# Patient Record
Sex: FEMALE | Race: WHITE | Employment: PART TIME | URBAN - METROPOLITAN AREA
[De-identification: names, ages, dates, MRNs, and addresses within clinical notes are randomized per-mention and may not be internally consistent; named-entity substitution may affect disease eponyms.]

---

## 2019-12-31 ENCOUNTER — OFFICE VISIT (OUTPATIENT)
Dept: FAMILY MEDICINE CLINIC | Age: 24
End: 2019-12-31
Payer: MEDICAID

## 2019-12-31 VITALS
DIASTOLIC BLOOD PRESSURE: 74 MMHG | TEMPERATURE: 98.3 F | BODY MASS INDEX: 36.39 KG/M2 | OXYGEN SATURATION: 98 % | WEIGHT: 205.4 LBS | RESPIRATION RATE: 16 BRPM | HEART RATE: 101 BPM | HEIGHT: 63 IN | SYSTOLIC BLOOD PRESSURE: 120 MMHG

## 2019-12-31 DIAGNOSIS — N94.6 DYSMENORRHEA: ICD-10-CM

## 2019-12-31 DIAGNOSIS — F90.2 ATTENTION DEFICIT HYPERACTIVITY DISORDER (ADHD), COMBINED TYPE: Primary | ICD-10-CM

## 2019-12-31 DIAGNOSIS — E78.2 MIXED HYPERLIPIDEMIA: ICD-10-CM

## 2019-12-31 DIAGNOSIS — L70.8 OTHER ACNE: ICD-10-CM

## 2019-12-31 DIAGNOSIS — Q82.5 BIRTH MARK: ICD-10-CM

## 2019-12-31 DIAGNOSIS — N94.6 PAINFUL MENSTRUAL PERIODS: ICD-10-CM

## 2019-12-31 DIAGNOSIS — F39 MOOD DISORDER (HCC): ICD-10-CM

## 2019-12-31 PROCEDURE — 99204 OFFICE O/P NEW MOD 45 MIN: CPT | Performed by: INTERNAL MEDICINE

## 2019-12-31 RX ORDER — ATORVASTATIN CALCIUM 20 MG/1
20 TABLET, FILM COATED ORAL DAILY
COMMUNITY
End: 2019-12-31 | Stop reason: ALTCHOICE

## 2019-12-31 RX ORDER — BUPROPION HYDROCHLORIDE 150 MG/1
150 TABLET ORAL EVERY MORNING
COMMUNITY
End: 2019-12-31 | Stop reason: SDUPTHER

## 2019-12-31 RX ORDER — CLINDAMYCIN AND BENZOYL PEROXIDE 10; 50 MG/G; MG/G
GEL TOPICAL
Qty: 50 G | Refills: 5 | Status: SHIPPED | OUTPATIENT
Start: 2019-12-31 | End: 2020-02-24

## 2019-12-31 RX ORDER — BUPROPION HYDROCHLORIDE 150 MG/1
150 TABLET ORAL EVERY MORNING
Qty: 30 TABLET | Refills: 5 | Status: SHIPPED | OUTPATIENT
Start: 2019-12-31 | End: 2020-05-28 | Stop reason: SDUPTHER

## 2019-12-31 RX ORDER — NAPROXEN 500 MG/1
500 TABLET ORAL 2 TIMES DAILY WITH MEALS
COMMUNITY
End: 2019-12-31 | Stop reason: SDUPTHER

## 2019-12-31 RX ORDER — NAPROXEN 500 MG/1
500 TABLET ORAL 2 TIMES DAILY PRN
Qty: 60 TABLET | Refills: 5 | Status: SHIPPED | OUTPATIENT
Start: 2019-12-31

## 2019-12-31 RX ORDER — METHYLPHENIDATE HYDROCHLORIDE 54 MG/1
54 TABLET, EXTENDED RELEASE ORAL EVERY MORNING
Qty: 30 TABLET | Refills: 0 | Status: SHIPPED | OUTPATIENT
Start: 2019-12-31 | End: 2020-08-13

## 2019-12-31 RX ORDER — ATORVASTATIN CALCIUM 20 MG/1
20 TABLET, FILM COATED ORAL DAILY
Qty: 30 TABLET | Refills: 5 | Status: CANCELLED | OUTPATIENT
Start: 2019-12-31

## 2019-12-31 RX ORDER — METHYLPHENIDATE HYDROCHLORIDE 54 MG/1
54 TABLET, EXTENDED RELEASE ORAL EVERY MORNING
COMMUNITY
End: 2019-12-31 | Stop reason: SDUPTHER

## 2019-12-31 RX ORDER — NORGESTIMATE AND ETHINYL ESTRADIOL 0.25-0.035
1 KIT ORAL DAILY
Qty: 1 PACKET | Refills: 11 | Status: SHIPPED | OUTPATIENT
Start: 2019-12-31 | End: 2020-08-13

## 2019-12-31 SDOH — HEALTH STABILITY: MENTAL HEALTH: HOW OFTEN DO YOU HAVE A DRINK CONTAINING ALCOHOL?: NEVER

## 2019-12-31 ASSESSMENT — PATIENT HEALTH QUESTIONNAIRE - PHQ9
SUM OF ALL RESPONSES TO PHQ9 QUESTIONS 1 & 2: 0
SUM OF ALL RESPONSES TO PHQ QUESTIONS 1-9: 0
SUM OF ALL RESPONSES TO PHQ QUESTIONS 1-9: 0
1. LITTLE INTEREST OR PLEASURE IN DOING THINGS: 0
2. FEELING DOWN, DEPRESSED OR HOPELESS: 0

## 2020-01-13 ENCOUNTER — HOSPITAL ENCOUNTER (OUTPATIENT)
Age: 25
Setting detail: SPECIMEN
Discharge: HOME | End: 2020-01-13
Payer: COMMERCIAL

## 2020-01-13 LAB
ABSOLUTE EOS #: 0.27 K/UL (ref 0–0.44)
ABSOLUTE IMMATURE GRANULOCYTE: <0.03 K/UL (ref 0–0.3)
ABSOLUTE LYMPH #: 1.64 K/UL (ref 1.1–3.7)
ABSOLUTE MONO #: 0.38 K/UL (ref 0.1–1.2)
ALBUMIN SERPL-MCNC: 4 G/DL (ref 3.5–5.2)
ALBUMIN/GLOBULIN RATIO: 1.2 (ref 1–2.5)
ALP BLD-CCNC: 65 U/L (ref 35–104)
ALT SERPL-CCNC: 31 U/L (ref 5–33)
ANION GAP SERPL CALCULATED.3IONS-SCNC: 13 MMOL/L (ref 9–17)
AST SERPL-CCNC: 23 U/L
BASOPHILS # BLD: 1 % (ref 0–2)
BASOPHILS ABSOLUTE: 0.04 K/UL (ref 0–0.2)
BILIRUB SERPL-MCNC: 0.25 MG/DL (ref 0.3–1.2)
BUN BLDV-MCNC: 12 MG/DL (ref 6–20)
BUN/CREAT BLD: ABNORMAL (ref 9–20)
CALCIUM SERPL-MCNC: 9.6 MG/DL (ref 8.6–10.4)
CHLORIDE BLD-SCNC: 104 MMOL/L (ref 98–107)
CO2: 23 MMOL/L (ref 20–31)
CREAT SERPL-MCNC: 0.61 MG/DL (ref 0.5–0.9)
DIFFERENTIAL TYPE: ABNORMAL
EOSINOPHILS RELATIVE PERCENT: 5 % (ref 1–4)
ESTIMATED AVERAGE GLUCOSE: 100 MG/DL
GFR AFRICAN AMERICAN: >60 ML/MIN
GFR NON-AFRICAN AMERICAN: >60 ML/MIN
GFR SERPL CREATININE-BSD FRML MDRD: ABNORMAL ML/MIN/{1.73_M2}
GFR SERPL CREATININE-BSD FRML MDRD: ABNORMAL ML/MIN/{1.73_M2}
GLUCOSE BLD-MCNC: 86 MG/DL (ref 70–99)
HBA1C MFR BLD: 5.1 % (ref 4–6)
HCT VFR BLD CALC: 41.2 % (ref 36.3–47.1)
HEMOGLOBIN: 13.9 G/DL (ref 11.9–15.1)
IMMATURE GRANULOCYTES: 0 %
LYMPHOCYTES # BLD: 32 % (ref 24–43)
MCH RBC QN AUTO: 30 PG (ref 25.2–33.5)
MCHC RBC AUTO-ENTMCNC: 33.7 G/DL (ref 28.4–34.8)
MCV RBC AUTO: 89 FL (ref 82.6–102.9)
MONOCYTES # BLD: 7 % (ref 3–12)
NRBC AUTOMATED: 0 PER 100 WBC
PDW BLD-RTO: 13.1 % (ref 11.8–14.4)
PLATELET # BLD: 337 K/UL (ref 138–453)
PLATELET ESTIMATE: ABNORMAL
PMV BLD AUTO: 10.2 FL (ref 8.1–13.5)
POTASSIUM SERPL-SCNC: 4.4 MMOL/L (ref 3.7–5.3)
RBC # BLD: 4.63 M/UL (ref 3.95–5.11)
RBC # BLD: ABNORMAL 10*6/UL
SEG NEUTROPHILS: 55 % (ref 36–65)
SEGMENTED NEUTROPHILS ABSOLUTE COUNT: 2.81 K/UL (ref 1.5–8.1)
SODIUM BLD-SCNC: 140 MMOL/L (ref 135–144)
TOTAL PROTEIN: 7.4 G/DL (ref 6.4–8.3)
TSH SERPL DL<=0.05 MIU/L-ACNC: 1.06 MIU/L (ref 0.3–5)
WBC # BLD: 5.2 K/UL (ref 3.5–11.3)
WBC # BLD: ABNORMAL 10*3/UL

## 2020-01-22 RX ORDER — ATOMOXETINE 40 MG/1
40 CAPSULE ORAL DAILY
Qty: 30 CAPSULE | Refills: 3 | Status: SHIPPED | OUTPATIENT
Start: 2020-01-22 | End: 2020-05-28 | Stop reason: SDUPTHER

## 2020-02-24 ENCOUNTER — OFFICE VISIT (OUTPATIENT)
Dept: OBGYN CLINIC | Age: 25
End: 2020-02-24
Payer: COMMERCIAL

## 2020-02-24 VITALS
DIASTOLIC BLOOD PRESSURE: 74 MMHG | WEIGHT: 204 LBS | SYSTOLIC BLOOD PRESSURE: 124 MMHG | BODY MASS INDEX: 36.14 KG/M2 | HEIGHT: 63 IN | HEART RATE: 84 BPM

## 2020-02-24 PROCEDURE — 99203 OFFICE O/P NEW LOW 30 MIN: CPT | Performed by: OBSTETRICS & GYNECOLOGY

## 2020-02-24 ASSESSMENT — ENCOUNTER SYMPTOMS
SHORTNESS OF BREATH: 0
ABDOMINAL PAIN: 0
COUGH: 0
BACK PAIN: 0

## 2020-02-24 NOTE — PROGRESS NOTES
organizations: Not on file     Relationship status: Not on file    Intimate partner violence:     Fear of current or ex partner: Not on file     Emotionally abused: Not on file     Physically abused: Not on file     Forced sexual activity: Not on file   Other Topics Concern    Not on file   Social History Narrative    Not on file     Family History   Problem Relation Age of Onset    High Blood Pressure Mother     Seizures Father     Lung Cancer Paternal Grandmother     Lung Cancer Paternal Grandfather        Physical exam Physical Exam  Constitutional:       Appearance: Normal appearance. HENT:      Head: Normocephalic and atraumatic. Eyes:      Extraocular Movements: Extraocular movements intact. Pupils: Pupils are equal, round, and reactive to light. Neurological:      General: No focal deficit present. Mental Status: She is alert and oriented to person, place, and time. Skin:     General: Skin is warm and dry. Psychiatric:         Mood and Affect: Mood normal.         Behavior: Behavior normal.         Thought Content: Thought content normal.         Judgment: Judgment normal.         Assessment/Plan  1. Menorrhagia with regular cycle  - Pt currently on OCPs for her heavy periods. She likes the sprintec and it is working well for her, so will continue it at this time. Pt uncertain if she needs refills, so she is to call the office if she needs them. 2. Encounter to establish care with new doctor  - Will obtain records from previous Ob/GYN. Release of records signed by pt.       Pt to follow up for her annual exam.     Danyel Corley MD  [address]

## 2020-03-16 ENCOUNTER — HOSPITAL ENCOUNTER (EMERGENCY)
Age: 25
Discharge: HOME | End: 2020-03-16
Attending: EMERGENCY MEDICINE
Payer: COMMERCIAL

## 2020-03-16 ENCOUNTER — APPOINTMENT (OUTPATIENT)
Dept: GENERAL RADIOLOGY | Age: 25
End: 2020-03-16
Payer: COMMERCIAL

## 2020-03-16 ENCOUNTER — NURSE TRIAGE (OUTPATIENT)
Dept: OTHER | Facility: CLINIC | Age: 25
End: 2020-03-16

## 2020-03-16 VITALS
OXYGEN SATURATION: 97 % | BODY MASS INDEX: 36.14 KG/M2 | HEART RATE: 98 BPM | HEIGHT: 63 IN | TEMPERATURE: 98.7 F | RESPIRATION RATE: 16 BRPM | DIASTOLIC BLOOD PRESSURE: 70 MMHG | WEIGHT: 204 LBS | SYSTOLIC BLOOD PRESSURE: 116 MMHG

## 2020-03-16 LAB
CHP ED QC CHECK: NORMAL
D-DIMER QUANTITATIVE: 0.43 MG/L FEU
DIRECT EXAM: NORMAL
Lab: NORMAL
MYOGLOBIN: <21 NG/ML (ref 25–58)
PREGNANCY TEST URINE, POC: NORMAL
SPECIMEN DESCRIPTION: NORMAL
TROPONIN INTERP: ABNORMAL
TROPONIN T: ABNORMAL NG/ML
TROPONIN, HIGH SENSITIVITY: <6 NG/L (ref 0–14)

## 2020-03-16 PROCEDURE — 87804 INFLUENZA ASSAY W/OPTIC: CPT

## 2020-03-16 PROCEDURE — 85379 FIBRIN DEGRADATION QUANT: CPT

## 2020-03-16 PROCEDURE — 83874 ASSAY OF MYOGLOBIN: CPT

## 2020-03-16 PROCEDURE — 84484 ASSAY OF TROPONIN QUANT: CPT

## 2020-03-16 PROCEDURE — 81025 URINE PREGNANCY TEST: CPT

## 2020-03-16 PROCEDURE — 71046 X-RAY EXAM CHEST 2 VIEWS: CPT

## 2020-03-16 PROCEDURE — 99283 EMERGENCY DEPT VISIT LOW MDM: CPT

## 2020-03-16 PROCEDURE — 93005 ELECTROCARDIOGRAM TRACING: CPT | Performed by: NURSE PRACTITIONER

## 2020-03-16 RX ORDER — IBUPROFEN 800 MG/1
800 TABLET ORAL EVERY 8 HOURS PRN
Qty: 20 TABLET | Refills: 0 | Status: SHIPPED | OUTPATIENT
Start: 2020-03-16 | End: 2021-03-29 | Stop reason: ALTCHOICE

## 2020-03-16 RX ORDER — CYCLOBENZAPRINE HCL 10 MG
10 TABLET ORAL 3 TIMES DAILY PRN
Qty: 15 TABLET | Refills: 0 | Status: ON HOLD | OUTPATIENT
Start: 2020-03-16 | End: 2020-11-09

## 2020-03-16 ASSESSMENT — ENCOUNTER SYMPTOMS
SHORTNESS OF BREATH: 0
NAUSEA: 0
VOMITING: 0
ABDOMINAL PAIN: 0
COUGH: 1
WHEEZING: 0

## 2020-03-16 ASSESSMENT — PAIN DESCRIPTION - LOCATION: LOCATION: CHEST

## 2020-03-16 ASSESSMENT — PAIN SCALES - GENERAL: PAINLEVEL_OUTOF10: 4

## 2020-03-16 NOTE — TELEPHONE ENCOUNTER
Call received from [address]    Reason for Disposition   SEVERE chest pain    Answer Assessment - Initial Assessment Questions  1. LOCATION: \"Where does it hurt? \"        Mid rest - all the way across - pain woke her from sleep on Saturday night    2. RADIATION: \"Does the pain go anywhere else? \" (e.g., into neck, jaw, arms, back)      Denies radiation into above sites    3. ONSET: \"When did the chest pain begin? \" (Minutes, hours or days)       3- - while at work - took [employer] and they did not help. Pepto-bismol and omeprazole are not effective in relieving pain    4. PATTERN \"Does the pain come and go, or has it been constant since it started? \"  \"Does it get worse with exertion? \"       Constant - does not go away unless laying down on the bed or couch - when standing for long periods of time it gets worse. 5. DURATION: \"How long does it last\" (e.g., seconds, minutes, hours)      Constant pain    6. SEVERITY: \"How bad is the pain? \"  (e.g., Scale 1-10; mild, moderate, or severe)     - MILD (1-3): doesn't interfere with normal activities      - MODERATE (4-7): interferes with normal activities or awakens from sleep     - SEVERE (8-10): excruciating pain, unable to do any normal activities        8/10    7. CARDIAC RISK FACTORS: \"Do you have any history of heart problems or risk factors for heart disease? \" (e.g., prior heart attack, angina; high blood pressure, diabetes, being overweight, high cholesterol, smoking, or strong family history of heart disease)      [place] [address] [facility] [place]    8. PULMONARY RISK FACTORS: \"Do you have any history of lung disease? \"  (e.g., blood clots in lung, asthma, emphysema, birth control pills)      Denies    9. CAUSE: \"What do you think is causing the chest pain? \"      Not sure - trying to eliminate spicy foods. 10. OTHER SYMPTOMS: \"Do you have any other symptoms? \" (e.g., dizziness, nausea, vomiting, sweating, fever, difficulty breathing, cough) Little bit of cough      11. PREGNANCY: \"Is there any chance you are pregnant? \" \"When was your last menstrual period? \"        Denies - on birth control and last period 3/10    Protocols used: CHEST PAIN-ADULT-[place]    Caller reports symptoms as documented above. Caller informed of disposition. She is not immediately agreeable as she does not want to miss work. After further discussion she states she is going to have her sister take her to the hospital for evaluation. Please do not respond to the triage nurse through this encounter. Any subsequent communication should be directly with the patient.

## 2020-03-16 NOTE — LETTER
[address] [facility]  [address] [id number]  Phone: [phone]             March 16, 2020    Patient: Jayda Ieyr   YOB: 1995   Date of Visit: 3/16/2020       To Whom It May Concern:    Jayda Iyer was seen and treated in our emergency department on 3/16/2020.      Sincerely,             Signature:__________________________________

## 2020-03-16 NOTE — ED PROVIDER NOTES
[address] [facility]  EMERGENCY DEPARTMENT ENCOUNTER      Pt Name: Lisa Yanez  MRN: [id number]  [place] 1995  Date of evaluation: 3/16/2020  Provider: Ta Altman       Chief Complaint   Patient presents with    Chest Pain     4 days    Cough     today         HISTORY OFPRESENT ILLNESS  (Location/Symptom, Timing/Onset, Context/Setting, Quality, Duration, Modifying Factors, Severity.)   Lisa Yanez is a 30 y.o. female who presents to the emergency department for evaluation of midsternal chest pain that started 4 days ago while she was at work. Her chest pain is 4 of 10. Is aggravated with certain movement as well as palpation to the chest.  She also planes of a cough that started today. No shortness of breath. 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 She does take birth control pills. No leg pain. No recent long distance travel. No fever. Nursing Notes were reviewed. PASTMEDICAL HISTORY     Past Medical History:   Diagnosis Date    ADHD     Depression     Hyperlipidemia     Candy-Parkinson-White (WPW) pattern          SURGICAL HISTORY       Past Surgical History:   Procedure Laterality Date    ATRIAL ABLATION SURGERY      due to WPW         CURRENT MEDICATIONS     Previous Medications    ATOMOXETINE (STRATTERA) 40 MG CAPSULE    Take 1 capsule by mouth daily    BUPROPION (WELLBUTRIN XL) 150 MG EXTENDED RELEASE TABLET    Take 1 tablet by mouth every morning    METHYLPHENIDATE (CONCERTA) 54 MG CR TABLET    Take 1 tablet by mouth every morning for 30 days.     NAPROXEN (NAPROSYN) 500 MG TABLET    Take 1 tablet by mouth 2 times daily as needed for Pain    NORGESTIMATE-ETHINYL ESTRADIOL (SPRINTEC 28) 0.25-35 MG-MCG PER TABLET    Take 1 tablet by mouth daily       ALLERGIES     Amoxicillin and Strawberry extract    FAMILY HISTORY       Family History   Problem Relation Age of Onset    High Blood Pressure Mother     Seizures Father     Lung Cancer Paternal Grandmother     Lung Cancer Paternal Grandfather           SOCIAL HISTORY       Social History     Socioeconomic History    Marital status: Single     Spouse name: None    Number of children: None    Years of education: None    Highest education level: None   Occupational History    None   Social Needs    Financial resource strain: None    Food insecurity     Worry: None     Inability: None    Transportation needs     Medical: None     Non-medical: None   Tobacco Use    Smoking status: Never Smoker    Smokeless tobacco: Never Used   Substance and Sexual Activity    Alcohol use: Never     Frequency: Never    Drug use: Never    Sexual activity: Never   Lifestyle    Physical activity     Days per week: None     Minutes per session: None    Stress: None   Relationships    Social connections     Talks on phone: None     Gets together: None     Attends Religion service: None     Active member of club or organization: None     Attends meetings of clubs or organizations: None     Relationship status: None    Intimate partner violence     Fear of current or ex partner: None     Emotionally abused: None     Physically abused: None     Forced sexual activity: None   Other Topics Concern    None   Social History Narrative    None         REVIEW OF SYSTEMS    (2-9 systems for level 4, 10 or more for level 5)     Review of Systems   Respiratory: Positive for cough. Negative for shortness of breath and wheezing. Cardiovascular: Positive for chest pain. Gastrointestinal: Negative for abdominal pain, nausea and vomiting. All other systems reviewed and are negative. Except as noted above the remainder of the review of systems was reviewed and negative.      PHYSICAL EXAM    (up to 7 for level 4, 8 or more for level 5)     ED Triage Vitals [03/16/20 1400]   BP Temp Temp Source Pulse Resp SpO2 Height Weight   116/70 98.7 °F (37.1 °C) Oral 98 16 97 % 5' 3\" (1.6 m) 204 lb (92.5 kg)       Physical Exam  Constitutional:       Appearance: Normal appearance. She is well-developed, well-groomed and normal weight. HENT:      Head: Normocephalic and atraumatic. Right Ear: External ear normal.      Left Ear: External ear normal.      Nose: Nose normal.      Mouth/Throat:      Mouth: Mucous membranes are moist.      Pharynx: Oropharynx is clear. Eyes:      Extraocular Movements: Extraocular movements intact. Conjunctiva/sclera: Conjunctivae normal.      Pupils: Pupils are equal, round, and reactive to light. Neck:      Musculoskeletal: Normal range of motion and neck supple. Cardiovascular:      Rate and Rhythm: Normal rate and regular rhythm. Pulses: Normal pulses. Heart sounds: Normal heart sounds, S1 normal and S2 normal.   Pulmonary:      Effort: Pulmonary effort is normal.      Breath sounds: Normal breath sounds and air entry. Chest:      Chest wall: Tenderness present. No crepitus. Musculoskeletal: Normal range of motion. Skin:     General: Skin is warm and dry. Capillary Refill: Capillary refill takes less than 2 seconds. Neurological:      Mental Status: She is alert and oriented to person, place, and time. GCS: GCS eye subscore is 4. GCS verbal subscore is 5. GCS motor subscore is 6. Cranial Nerves: Cranial nerves are intact. Sensory: Sensation is intact. Motor: Motor function is intact. Coordination: Coordination is intact. Gait: Gait is intact. Psychiatric:         Mood and Affect: Mood normal.         Behavior: Behavior normal.         Thought Content:  Thought content normal.         Judgment: Judgment normal.           DIAGNOSTIC RESULTS     EKG:All EKG's are interpreted by the Emergency Department Physician who either signs or Co-signs this chart in the absence of a cardiologist.    EKG interpreted by attending physician    RADIOLOGY:   Non-plain film images such as CT, Ultrasound and MRI are read by REFERRED TO:   DO Jody Haskins [address]  [address]    Schedule an appointment as soon as possible for a visit       [facility]  [address] [facility]  [phone]    If symptoms worsen      DISCHARGE MEDICATIONS:     New Prescriptions    CYCLOBENZAPRINE (FLEXERIL) 10 MG TABLET    Take 1 tablet by mouth 3 times daily as needed for Muscle spasms    IBUPROFEN (ADVIL;MOTRIN) 800 MG TABLET    Take 1 tablet by mouth every 8 hours as needed for Pain     Electronically signed by JOSE Rush 3/16/2020 at 3:27 PM           JOSE Rush CNP  03/16/20 1       Eva Corey MD  03/17/20 [id number]

## 2020-03-17 LAB — HCG, PREGNANCY URINE (POC): NEGATIVE

## 2020-03-17 NOTE — ED PROVIDER NOTES
eMERGENCY dEPARTMENT eNCOUnter   Independent Attestation     Pt Name: Syd Muniz  MRN: [id number]  [place] 1995  Date of evaluation: 3/17/20     Syd Muniz is a 30 y.o. female with CC: Chest Pain (4 days) and Cough (today)      Based on the medical record the care appears appropriate. I was personally available for consultation in the Emergency Department.     Arnav Luu MD  Attending Emergency Physician                  Stephany Sheppard MD  03/17/20 [id number]

## 2020-03-19 LAB
EKG ATRIAL RATE: 96 BPM
EKG P AXIS: 46 DEGREES
EKG P-R INTERVAL: 132 MS
EKG Q-T INTERVAL: 338 MS
EKG QRS DURATION: 80 MS
EKG QTC CALCULATION (BAZETT): 427 MS
EKG R AXIS: 44 DEGREES
EKG T AXIS: 14 DEGREES
EKG VENTRICULAR RATE: 96 BPM

## 2020-05-28 RX ORDER — BUPROPION HYDROCHLORIDE 150 MG/1
150 TABLET ORAL EVERY MORNING
Qty: 30 TABLET | Refills: 5 | Status: SHIPPED | OUTPATIENT
Start: 2020-05-28 | End: 2020-10-08 | Stop reason: ALTCHOICE

## 2020-05-28 RX ORDER — ATOMOXETINE 40 MG/1
40 CAPSULE ORAL DAILY
Qty: 30 CAPSULE | Refills: 3 | Status: SHIPPED | OUTPATIENT
Start: 2020-05-28 | End: 2020-09-29

## 2020-07-06 ENCOUNTER — OFFICE VISIT (OUTPATIENT)
Dept: DERMATOLOGY | Age: 25
End: 2020-07-06
Payer: COMMERCIAL

## 2020-07-06 VITALS
DIASTOLIC BLOOD PRESSURE: 76 MMHG | OXYGEN SATURATION: 97 % | SYSTOLIC BLOOD PRESSURE: 110 MMHG | TEMPERATURE: 97.2 F | HEIGHT: 63 IN | HEART RATE: 92 BPM | BODY MASS INDEX: 35.68 KG/M2 | WEIGHT: 201.4 LBS

## 2020-07-06 PROCEDURE — 99202 OFFICE O/P NEW SF 15 MIN: CPT | Performed by: DERMATOLOGY

## 2020-07-06 RX ORDER — KETOCONAZOLE 20 MG/ML
SHAMPOO TOPICAL
Qty: 120 ML | Refills: 11 | Status: SHIPPED | OUTPATIENT
Start: 2020-07-06 | End: 2021-07-27

## 2020-07-06 RX ORDER — CLINDAMYCIN PHOSPHATE 11.9 MG/ML
SOLUTION TOPICAL
Qty: 60 ML | Refills: 11 | Status: SHIPPED | OUTPATIENT
Start: 2020-07-06 | End: 2020-08-05

## 2020-07-06 NOTE — PATIENT INSTRUCTIONS
1. Wash hair 3-4 times weekly with ketoconazole shampoo  2. Referral to plastic surgery  3. Follow up in 3-4 months    Your Acne Treatment Prescribed by your Doctor: It is important to remember that oil glands respond very slowly and your skin will not change overnight. Your skin may get worse during the first weeks of treatment because all of the clogged pores are opening to the surface. Try not to get frustrated with your skin's slow response. Try to be consistent and follow these instructions from your doctor. If your acne has not responded to these treatments in 2-3 months, your doctor may recommend other medications. MORNING  Wash your face and other areas of acne with benzoyl peroxide. Apply clindamycin (Cleocin, Clindagel) to areas of acne in a thin layer. May include face, shoulders, back, and chest.    NIGHT  Wash your face and other areas of acne with benzoyl peroxide}. Apply tretinoin (Avita, Retin-A) to areas of acne in a thin layer. May include face, shoulders, back, and chest.    It is important to apply the right topical medication (cream or gel) at the right time of the day, so please follow the instructions written above. Products with benzoyl peroxide in them can bleach towels and clothes, so use them carefully. It is important to avoid moisturizers, make-up, hair products and sunscreens made with oil. These products can contribute to acne breakouts by plugging your pores. Look for \"oil-free\" and \"non-comedogenic\" products. Many brands such as Neutrogena, Aveeno, Cetaphil, Purpose, Eucerin and Olay make moisturizers and sunscreens that are oil-free. Washing your Face:  Wash your face 2 times a day with a mild soap or prescribed facial cleanser. Be gentle in washing your face and follow these steps:  Splash water onto your face  Lather the soap in your hands and gently rub onto your face. You do not need to use a washcloth.   Splash the face to rinse off the cleanser. Pat your face dry with a towel and, if it is time to, apply your prescription medication. Avoid Astringents. Please have your pharmacist call our office if you are having trouble getting your medications or need refills. Medications for Acne  *Topical and oral acne medications are not safe for pregnant women. No acne medications should be used by pregnant women without first consulting their physician *    Topical Medications (Creams and Gels) for Acne:  Topical medications are those applied to the outside of your skin. For these medications to work well, they need to be applied in a thin layer everywhere the acne comes and not just to the pimples you see. Follow these steps:  Put a chocolate chip size amount of medication cream/gel onto your finger. Dab the medicine onto your forehead, nose, chin, and each cheek. Then gently spread a thin layer across the entire face. Do not wash off this medicine. These medications can also be used on your chest, back and shoulder areas. Do not use over the counter acne creams or gels in addition to your prescribed medications unless directed by your doctor. Topical acne medications can cause irritation, redness, and dryness, especially when you first start them. If your prescribed topical cream or gel is too irritating at first, try using it every other day or once every 3 days instead of every day. As your skin becomes less sensitive, you may be able to start to use it every day. To help soothe the dryness, you can use an oil-free, \"non-comedogenic\" moisturizer. Many acne medications also make the skin more sensitive to sunlight, so it is important to use an oil-free, \"non-comedogenic\" sunscreen if you will be out in the sun for work or fun. Some products available include: Oil of Olay Complete Defense for Sensitive Skin, Purpose Facial Lotion, Cetaphil Lotion, Neutrogenia Moisturizer and Aveeno Moisturizer.   Some products contain both

## 2020-07-06 NOTE — PROGRESS NOTES
Dermatology Patient Note  [facility]  [address]  [address] [facility] [address]  Dept: [phone]  Dept Fax: [phone]      VISIT DATE: 7/6/2020   REFERRING PROVIDER: Bunny Wilkins DO      Naomi Torres is a 30 y.o. female  who presents today in the office for:    New Patient (Acne: No treatment. She has it ont the face, back, and chest. She develops bumps after shaving in her vaginal areal. )      HISTORY OF PRESENT ILLNESS:  HPI Acne:    Marla Owen was seen today for initial evaluation of acne. Duration of Acne:  several years     Course:  Worsening    Areas of Involvement: face, chets, back    Associated Symptoms: Pustules/Boils     Exacerbating Factors:  hormones    Current Skin Care Regimen: Washes face twice daily with soap and water    Previously Tried Medications: nothing      CURRENT MEDICATIONS:   Current Outpatient Medications   Medication Sig Dispense Refill    benzoyl peroxide 5 % external liquid Wash face, chest and back and groin 1-2 times daily 227 g 11    tretinoin (RETIN-A) 0.025 % cream Apply pea sized amount to face, chest and back nightly 45 g 11    clindamycin (CLEOCIN T) 1 % external solution Apply topically every morning to acne prone areas on face 60 mL 11    ketoconazole (NIZORAL) 2 % shampoo Apply 3-4 times weekly to scalp, leave on for five minutes prior to washing off 120 mL 11    buPROPion (WELLBUTRIN XL) 150 MG extended release tablet Take 1 tablet by mouth every morning 30 tablet 5    atomoxetine (STRATTERA) 40 MG capsule Take 1 capsule by mouth daily 30 capsule 3    ibuprofen (ADVIL;MOTRIN) 800 MG tablet Take 1 tablet by mouth every 8 hours as needed for Pain 20 tablet 0    cyclobenzaprine (FLEXERIL) 10 MG tablet Take 1 tablet by mouth 3 times daily as needed for Muscle spasms 15 tablet 0    norgestimate-ethinyl estradiol (SPRINTEC 28) 0.25-35 MG-MCG per tablet Take 1 tablet by mouth daily 1 packet 11    naproxen (NAPROSYN) 500 MG tablet Take 1 tablet by mouth 2 times daily as needed for Pain 60 tablet 5    Methylphenidate (CONCERTA) 54 MG CR tablet Take 1 tablet by mouth every morning for 30 days. 30 tablet 0     No current facility-administered medications for this visit. ALLERGIES:   Allergies   Allergen Reactions    Amoxicillin     Strawberry Extract Swelling       SOCIAL HISTORY:  Social History     Tobacco Use    Smoking status: Never Smoker    Smokeless tobacco: Never Used   Substance Use Topics    Alcohol use: Never     Frequency: Never       REVIEW OF SYSTEMS:  Review of Systems   Constitutional: Negative. Skin:Denies any new changing, growing or bleeding lesions or rashes except as described in the HPI     PHYSICAL EXAM:   /76 (Site: Left Upper Arm, Position: Sitting, Cuff Size: Large Adult)   Pulse 92   Temp 97.2 °F (36.2 °C)   Ht 5' 3\" (1.6 m)   Wt 201 lb 6.4 oz (91.4 kg)   SpO2 97%   BMI 35.68 kg/m²     General Exam:  General Appearance: No acute distress, Well nourished     Neuro: Alert and oriented to person, place and time  Psych: Normal affect   Lymph Node: Not performed    Cutaneous Exam: Performed as documented in clinic note below. 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 Acne vulgaris     2. Congenital benign skin mole  External Referral To Plastic Surgery   3. Seborrheic dermatitis         Plan of Action is as Follows:  Assessment 1. Acne vulgaris  1. Start benzoyl peroxide wash every morning  2. Start clindamycin soln every morning  3.   Start tretinoin 0.025% daily at bedtime  4. Follow-up in 3-4 months. 2. Congenital benign skin mole  - External Referral To Plastic Surgery    3. Seborrheic dermatitis  - Ketoconazole shampoo          Patient Instructions   1. Wash hair 3-4 times weekly with ketoconazole shampoo  2. Referral to plastic surgery  3. Follow up in 3-4 months    Your Acne Treatment Prescribed by your Doctor: It is important to remember that oil glands respond very slowly and your skin will not change overnight. Your skin may get worse during the first weeks of treatment because all of the clogged pores are opening to the surface. Try not to get frustrated with your skin's slow response. Try to be consistent and follow these instructions from your doctor. If your acne has not responded to these treatments in 2-3 months, your doctor may recommend other medications. MORNING  Wash your face and other areas of acne with benzoyl peroxide. Apply clindamycin (Cleocin, Clindagel) to areas of acne in a thin layer. May include face, shoulders, back, and chest.    NIGHT  Wash your face and other areas of acne with benzoyl peroxide}. Apply tretinoin (Avita, Retin-A) to areas of acne in a thin layer. May include face, shoulders, back, and chest.    It is important to apply the right topical medication (cream or gel) at the right time of the day, so please follow the instructions written above. Products with benzoyl peroxide in them can bleach towels and clothes, so use them carefully. It is important to avoid moisturizers, make-up, hair products and sunscreens made with oil. These products can contribute to acne breakouts by plugging your pores. Look for \"oil-free\" and \"non-comedogenic\" products. Many brands such as Neutrogena, Aveeno, Cetaphil, Purpose, Eucerin and Olay make moisturizers and sunscreens that are oil-free. Washing your Face:  Wash your face 2 times a day with a mild soap or prescribed facial cleanser.   Be gentle in washing your face and follow these steps:  Splash water onto your face  Lather the soap in your hands and gently rub onto your face. You do not need to use a washcloth. Splash the face to rinse off the cleanser. Pat your face dry with a towel and, if it is time to, apply your prescription medication. Avoid Astringents. Please have your pharmacist call our office if you are having trouble getting your medications or need refills. Medications for Acne  *Topical and oral acne medications are not safe for pregnant women. No acne medications should be used by pregnant women without first consulting their physician *    Topical Medications (Creams and Gels) for Acne:  Topical medications are those applied to the outside of your skin. For these medications to work well, they need to be applied in a thin layer everywhere the acne comes and not just to the pimples you see. Follow these steps:  Put a chocolate chip size amount of medication cream/gel onto your finger. Dab the medicine onto your forehead, nose, chin, and each cheek. Then gently spread a thin layer across the entire face. Do not wash off this medicine. These medications can also be used on your chest, back and shoulder areas. Do not use over the counter acne creams or gels in addition to your prescribed medications unless directed by your doctor. Topical acne medications can cause irritation, redness, and dryness, especially when you first start them. If your prescribed topical cream or gel is too irritating at first, try using it every other day or once every 3 days instead of every day. As your skin becomes less sensitive, you may be able to start to use it every day. To help soothe the dryness, you can use an oil-free, \"non-comedogenic\" moisturizer.   Many acne medications also make the skin more sensitive to sunlight, so it is important to use an oil-free, \"non-comedogenic\" sunscreen if you will be out in the sun for work or fun.  Some products available include: Oil of Olay Complete Defense for Sensitive Skin, Purpose Facial Lotion, Cetaphil Lotion, Neutrogenia Moisturizer and Aveeno Moisturizer. Some products contain both moisturizer and sunscreen. Topical acne medications and washes containing Benzoyl Peroxide may bleach your clothing, towels, and bedding. Take care when using these products so that your things don't get ruined. You may want to use white towels and sheets to minimize the bleaching effect. Oral Antibiotics (Pills) for Acne:  Antibiotics are an important part of treating acne. They work from the inside of your body to kill the bacteria that cause the red, inflamed bumps and pus-filled bumps. Oral antibiotics are often used in addition to topical creams and gels. Many antibiotics can cause nausea, stomach aches, vomiting or diarrhea. It is important to take your antibiotic with a large glass of water. Taking the antibiotic with food may be helpful, except for Tetracycline which must be taken on an empty stomach to be effective. Any antibiotic can cause an allergic reaction or rash. Your doctor will regularly ask you if you are having any side effects. Commonly used acne antibiotic pills include:    Erythromycin                                        Bactrim                                        Doxycycline  Clindamycin                                         Tetracycline                                 Minocycline    If you are on Minocycline, please report any problems with headaches, blurry vision, ringing ears, joint aches or new blue-gray spots on your skin. If you are on Tetracycline or Doxycycline, your skin will be very sensitive to sunlight and will burn easily so sun protection is important. If you are on Clindamycin, please report any problems with persistent diarrhea. Photo surveillance performed: No    Follow-up: Return in about 4 months (around 11/6/2020).       This note was

## 2020-07-27 ENCOUNTER — OFFICE VISIT (OUTPATIENT)
Dept: OBGYN CLINIC | Age: 25
End: 2020-07-27
Payer: COMMERCIAL

## 2020-07-27 VITALS
WEIGHT: 199 LBS | SYSTOLIC BLOOD PRESSURE: 122 MMHG | HEIGHT: 63 IN | DIASTOLIC BLOOD PRESSURE: 68 MMHG | TEMPERATURE: 98.2 F | HEART RATE: 88 BPM | BODY MASS INDEX: 35.26 KG/M2

## 2020-07-27 PROCEDURE — 99213 OFFICE O/P EST LOW 20 MIN: CPT | Performed by: OBSTETRICS & GYNECOLOGY

## 2020-07-27 ASSESSMENT — ENCOUNTER SYMPTOMS
BACK PAIN: 0
ABDOMINAL PAIN: 0
COUGH: 0
SHORTNESS OF BREATH: 0

## 2020-07-27 NOTE — PROGRESS NOTES
[facility]  [facility] - [address] [facility] [place] [facility]  Dept: [phone]  Dept Fax: [phone]    20    Chief Complaint   Patient presents with    Gynecologic Exam     prev pap 2018    Menstrual Problem       Dyan Rivers 30 y.o. is here to discuss her heavy irregular periods. She says that she is having 2 periods a month. She says even with her OCPs she is having this irregular bleeding. She has had a Mirena IUD in the past and still had heavy bleeding. She only kept it in for 5 weeks previously though. She wants to try something different. Review of Systems   Constitutional: Negative for chills and fever. HENT: Negative for congestion. Respiratory: Negative for cough and shortness of breath. Cardiovascular: Negative for chest pain and palpitations. Gastrointestinal: Negative for abdominal pain. Genitourinary: Negative for dyspareunia and vaginal discharge. Musculoskeletal: Negative for back pain. Neurological: Negative for dizziness and light-headedness. Psychiatric/Behavioral: The patient is not nervous/anxious. Gynecologic History  Patient's last menstrual period was 2020 (approximate).   Contraception: OCP (estrogen/progesterone)  Last Pap:   Results: normal  Last Mammogram: n/a    Obstetric History  : 2  Para: 1  AB: 1    Past Medical History:   Diagnosis Date    ADHD     Depression     Hyperlipidemia     Candy-Parkinson-White (WPW) pattern      Past Surgical History:   Procedure Laterality Date    ATRIAL ABLATION SURGERY      due to WPW     Allergies   Allergen Reactions    Amoxicillin     Strawberry Extract Swelling     Current Outpatient Medications   Medication Sig Dispense Refill    norelgestromin-ethinyl estradiol (ORTHO EVRA) 150-35 MCG/24HR Place 1 patch onto the skin once a week 3 patch 12    benzoyl peroxide 5 % external liquid Wash face, chest and back and groin 1-2 times daily 227 g 11    tretinoin (RETIN-A) 0.025 % cream Apply pea sized amount to face, chest and back nightly 45 g 11    clindamycin (CLEOCIN T) 1 % external solution Apply topically every morning to acne prone areas on face 60 mL 11    ketoconazole (NIZORAL) 2 % shampoo Apply 3-4 times weekly to scalp, leave on for five minutes prior to washing off 120 mL 11    buPROPion (WELLBUTRIN XL) 150 MG extended release tablet Take 1 tablet by mouth every morning 30 tablet 5    atomoxetine (STRATTERA) 40 MG capsule Take 1 capsule by mouth daily 30 capsule 3    ibuprofen (ADVIL;MOTRIN) 800 MG tablet Take 1 tablet by mouth every 8 hours as needed for Pain 20 tablet 0    cyclobenzaprine (FLEXERIL) 10 MG tablet Take 1 tablet by mouth 3 times daily as needed for Muscle spasms 15 tablet 0    norgestimate-ethinyl estradiol (SPRINTEC 28) 0.25-35 MG-MCG per tablet Take 1 tablet by mouth daily 1 packet 11    naproxen (NAPROSYN) 500 MG tablet Take 1 tablet by mouth 2 times daily as needed for Pain 60 tablet 5    Methylphenidate (CONCERTA) 54 MG CR tablet Take 1 tablet by mouth every morning for 30 days. 30 tablet 0     No current facility-administered medications for this visit.       Social History     Socioeconomic History    Marital status: Single     Spouse name: Not on file    Number of children: Not on file    Years of education: Not on file    Highest education level: Not on file   Occupational History    Not on file   Social Needs    Financial resource strain: Not on file    Food insecurity     Worry: Not on file     Inability: Not on file    Transportation needs     Medical: Not on file     Non-medical: Not on file   Tobacco Use    Smoking status: Never Smoker    Smokeless tobacco: Never Used   Substance and Sexual Activity    Alcohol use: Never     Frequency: Never    Drug use: Never    Sexual activity: Never   Lifestyle    Physical activity     Days per week: Not on file     Minutes per session: Not on file    Stress: Not on file   Relationships    Social connections     Talks on phone: Not on file     Gets together: Not on file     Attends Congregation service: Not on file     Active member of club or organization: Not on file     Attends meetings of clubs or organizations: Not on file     Relationship status: Not on file    Intimate partner violence     Fear of current or ex partner: Not on file     Emotionally abused: Not on file     Physically abused: Not on file     Forced sexual activity: Not on file   Other Topics Concern    Not on file   Social History Narrative    Not on file     Family History   Problem Relation Age of Onset    High Blood Pressure Mother     Seizures Father     Lung Cancer Paternal Grandmother     Lung Cancer Paternal Grandfather        Physical exam Physical Exam  Constitutional:       Appearance: Normal appearance. She is obese. HENT:      Head: Normocephalic. Eyes:      Extraocular Movements: Extraocular movements intact. Pulmonary:      Effort: Pulmonary effort is normal.   Neurological:      Mental Status: She is alert and oriented to person, place, and time. Psychiatric:         Mood and Affect: Mood normal.         Behavior: Behavior normal.         Thought Content: Thought content normal.         Judgment: Judgment normal.         Assessment/Plan  1. Breakthrough bleeding on birth control pills  - Has tried OCPs and continues to have irregular bleeding. Has tried Mirena in the past.  Discussed risks/benefits of various other options. Pt would like to try the patch. Discussed how and when to start it. Info given. Discussed possibility of trying Mirena again in the future if the patch doesn't work.       Pt to follow up for annual exam  Chantel Blue MD  [address]

## 2020-08-28 ENCOUNTER — TELEPHONE (OUTPATIENT)
Dept: OBGYN CLINIC | Age: 25
End: 2020-08-28

## 2020-08-28 RX ORDER — DROSPIRENONE AND ETHINYL ESTRADIOL 0.03MG-3MG
1 KIT ORAL DAILY
Qty: 1 PACKET | Refills: 12 | Status: SHIPPED | OUTPATIENT
Start: 2020-08-28 | End: 2020-11-30 | Stop reason: SDUPTHER

## 2020-08-28 NOTE — TELEPHONE ENCOUNTER
I will send in a different pill if that's what she would like. I'll send it to her pharmacy.   Thanks

## 2020-08-28 NOTE — TELEPHONE ENCOUNTER
Pt calling in thinking her [facility] patch is giving her migraines and she feels sick when she stands up. Mom suggested she go back on the pill but a lower dose. Neftali Olson

## 2020-09-29 RX ORDER — ATOMOXETINE 40 MG/1
CAPSULE ORAL
Qty: 30 CAPSULE | Refills: 0 | Status: SHIPPED | OUTPATIENT
Start: 2020-09-29 | End: 2020-10-08 | Stop reason: ALTCHOICE

## 2020-10-08 ENCOUNTER — OFFICE VISIT (OUTPATIENT)
Dept: FAMILY MEDICINE CLINIC | Age: 25
End: 2020-10-08
Payer: COMMERCIAL

## 2020-10-08 VITALS
DIASTOLIC BLOOD PRESSURE: 68 MMHG | SYSTOLIC BLOOD PRESSURE: 110 MMHG | WEIGHT: 189 LBS | RESPIRATION RATE: 18 BRPM | TEMPERATURE: 97.2 F | HEART RATE: 118 BPM | BODY MASS INDEX: 33.48 KG/M2 | OXYGEN SATURATION: 98 %

## 2020-10-08 PROCEDURE — 99213 OFFICE O/P EST LOW 20 MIN: CPT | Performed by: INTERNAL MEDICINE

## 2020-10-08 RX ORDER — DOXYCYCLINE HYCLATE 100 MG/1
100 CAPSULE ORAL EVERY 12 HOURS
COMMUNITY
End: 2020-10-15 | Stop reason: SDUPTHER

## 2020-10-08 RX ORDER — BUPROPION HYDROCHLORIDE 150 MG/1
150 TABLET ORAL EVERY MORNING
Qty: 30 TABLET | Refills: 5 | Status: SHIPPED | OUTPATIENT
Start: 2020-10-08 | End: 2020-11-12

## 2020-10-08 RX ORDER — PAROXETINE HYDROCHLORIDE 20 MG/1
20 TABLET, FILM COATED ORAL NIGHTLY
Qty: 30 TABLET | Refills: 11 | Status: SHIPPED | OUTPATIENT
Start: 2020-10-08 | End: 2020-11-12 | Stop reason: SDUPTHER

## 2020-10-08 RX ORDER — DEXTROAMPHETAMINE SACCHARATE, AMPHETAMINE ASPARTATE, DEXTROAMPHETAMINE SULFATE AND AMPHETAMINE SULFATE 2.5; 2.5; 2.5; 2.5 MG/1; MG/1; MG/1; MG/1
10 TABLET ORAL 2 TIMES DAILY
Qty: 60 TABLET | Refills: 0 | Status: SHIPPED | OUTPATIENT
Start: 2020-10-08 | End: 2021-03-19 | Stop reason: SDUPTHER

## 2020-10-08 RX ORDER — SULFAMETHOXAZOLE AND TRIMETHOPRIM 800; 160 MG/1; MG/1
1 TABLET ORAL EVERY 12 HOURS
COMMUNITY
End: 2020-10-15 | Stop reason: SDUPTHER

## 2020-10-08 ASSESSMENT — ENCOUNTER SYMPTOMS
DIARRHEA: 0
COUGH: 0
ABDOMINAL PAIN: 0
SHORTNESS OF BREATH: 0
CONSTIPATION: 0
CHOKING: 0
COLOR CHANGE: 1
CHEST TIGHTNESS: 0

## 2020-10-08 ASSESSMENT — PATIENT HEALTH QUESTIONNAIRE - PHQ9
2. FEELING DOWN, DEPRESSED OR HOPELESS: 0
SUM OF ALL RESPONSES TO PHQ QUESTIONS 1-9: 0
1. LITTLE INTEREST OR PLEASURE IN DOING THINGS: 0
SUM OF ALL RESPONSES TO PHQ9 QUESTIONS 1 & 2: 0
SUM OF ALL RESPONSES TO PHQ QUESTIONS 1-9: 0

## 2020-10-09 NOTE — PROGRESS NOTES
[address] [facility]  [address] [place] [address]  Dept: [phone]  Dept Fax: [phone]    Edvin Rosario a 30 y.o. female who presents today for her medical conditions/complaints as notedbelow. Basil Magdalena is c/o of   Chief Complaint   Patient presents with    Wound Check     happened 9/24/20- scratched it under a metal door    Follow-Up from Hospital     watch 10/4-10/7-[facility]         HPI:     Pt initially here for wound   scrapped right middle finger on a door and got caught  Was not very healed on 10/2 so went to urgent care   They started bactrim and doxy   They also gave her a shot of rocephin when she is allergic to pcn so she broke out and she had to take meds for that   She has been wrapping sometimes at night and doing triple abx   Started abx 5 days ago but the doxy makes her very very sick almost to the point of throwing up   She has not seen any drainage or pus out but feels it is underneath , is painful       Also ended up in [place] on psych unit for suicidal ideations   Ran away with abby she met online for a few days last week in a hotel   She states at that time she was suicidal , he made her tat way   Since being out she has not felt SI or plan   She is depressed overall and down   She feels her meds are not working   They did not adjust her meds when she was on SI watch inpt   All labs were normal there too in ED, no drugs or etoh   She has to f/u with psych they sent her her counselor through insurance a list of names in [address] [place] [address] who take insurance   She is going to call tomorrow   She got records from [facility] in regards to her dx, is somewhat upset she has to start over again   Is taking a mental health month from work [address] [place] [address] Drive work to get things straight  Does not sleep at night  Also has anxiety and panic attacks     Wound Check   She was originally treated 5 to 10 days ago.  Previous treatment included oral antibiotics and wound cleansing or irrigation. Her temperature was unmeasured prior to arrival. There has been no drainage from the wound. The redness has not changed. The swelling has not changed. The pain has improved. There is difficulty moving the extremity or digit due to pain. Hemoglobin A1C (%)   Date Value   01/13/2020 5.1         ( goal A1C is < 7)   No results found for: LABMICR  No results found for: LDLCHOLESTEROL, LDLCALC    (goal LDL is <100)   AST (U/L)   Date Value   01/13/2020 23     ALT (U/L)   Date Value   01/13/2020 31     BUN (mg/dL)   Date Value   01/13/2020 12     BP Readings from Last 3 Encounters:   10/08/20 110/68   08/13/20 (!) 123/96   07/27/20 122/68          (goal 120/80)    Past Medical History:   Diagnosis Date    ADHD     Depression     Hyperlipidemia     Candy-Parkinson-White (WPW) pattern       Past Surgical History:   Procedure Laterality Date    ATRIAL ABLATION SURGERY      due to WPW       Family History   Problem Relation Age of Onset    High Blood Pressure Mother     Seizures Father     Lung Cancer Paternal Grandmother     Lung Cancer Paternal Grandfather        Social History     Tobacco Use    Smoking status: Never Smoker    Smokeless tobacco: Never Used   Substance Use Topics    Alcohol use: Never     Frequency: Never      Current Outpatient Medications   Medication Sig Dispense Refill    doxycycline hyclate (VIBRAMYCIN) 100 MG capsule Take 100 mg by mouth every 12 hours      sulfamethoxazole-trimethoprim (BACTRIM DS;SEPTRA DS) 800-160 MG per tablet Take 1 tablet by mouth every 12 hours      amphetamine-dextroamphetamine (ADDERALL, 10MG,) 10 MG tablet Take 1 tablet by mouth 2 times daily for 30 days.  60 tablet 0    PARoxetine (PAXIL) 20 MG tablet Take 1 tablet by mouth nightly 30 tablet 11    buPROPion (WELLBUTRIN XL) 150 MG extended release tablet Take 1 tablet by mouth every morning 30 tablet 5    benzoyl peroxide 5 % external liquid Wash face, chest and back and groin 1-2 times daily 227 g 11    ketoconazole (NIZORAL) 2 % shampoo Apply 3-4 times weekly to scalp, leave on for five minutes prior to washing off 120 mL 11    ibuprofen (ADVIL;MOTRIN) 800 MG tablet Take 1 tablet by mouth every 8 hours as needed for Pain 20 tablet 0    cyclobenzaprine (FLEXERIL) 10 MG tablet Take 1 tablet by mouth 3 times daily as needed for Muscle spasms 15 tablet 0    drospirenone-ethinyl estradiol (MEDINA 28) 3-0.03 MG TABS Take 1 tablet by mouth daily (Patient not taking: Reported on 10/8/2020) 1 packet 12    tretinoin (RETIN-A) 0.025 % cream Apply pea sized amount to face, chest and back nightly (Patient not taking: Reported on 10/8/2020) 45 g 11    naproxen (NAPROSYN) 500 MG tablet Take 1 tablet by mouth 2 times daily as needed for Pain (Patient not taking: Reported on 8/13/2020) 60 tablet 5     No current facility-administered medications for this visit. Allergies   Allergen Reactions    Amoxicillin     Strawberry Extract Swelling          Health Maintenance   Topic Date Due    Varicella vaccine (1 of 2 - 2-dose childhood series) 11/11/1994    HPV vaccine (1 - 2-dose series) 11/11/2004    HIV screen  11/11/2008    DTaP/Tdap/Td vaccine (1 - Tdap) 11/11/2012    Cervical cancer screen  11/11/2014    Flu vaccine (1) 09/01/2020    Hepatitis A vaccine  Aged Out    Hepatitis B vaccine  Aged Out    Hib vaccine  Aged Out    Meningococcal (ACWY) vaccine  Aged Out    Pneumococcal 0-64 years Vaccine  Aged Out       Subjective:     Review of Systems   Constitutional: Positive for fatigue. Negative for activity change, appetite change, chills, diaphoresis, fever and unexpected weight change. Eyes: Negative for visual disturbance. Respiratory: Negative for cough, choking, chest tightness and shortness of breath. Cardiovascular: Negative for chest pain, palpitations and leg swelling.    Gastrointestinal: Negative for abdominal pain, constipation and diarrhea. Musculoskeletal: Positive for arthralgias. Skin: Positive for color change and wound. Negative for rash. Neurological: Negative for dizziness, light-headedness and headaches. Psychiatric/Behavioral: Positive for behavioral problems, decreased concentration, dysphoric mood and sleep disturbance. Negative for confusion, hallucinations, self-injury and suicidal ideas. The patient is nervous/anxious. The patient is not hyperactive. Objective:      Physical Exam  Vitals signs and nursing note reviewed. Constitutional:       General: She is not in acute distress. Appearance: She is obese. She is not ill-appearing, toxic-appearing or diaphoretic. HENT:      Head: Normocephalic and atraumatic. Neck:      Musculoskeletal: Normal range of motion and neck supple. Cardiovascular:      Rate and Rhythm: Normal rate and regular rhythm. Pulmonary:      Effort: Pulmonary effort is normal.      Breath sounds: Normal breath sounds. Skin:     General: Skin is warm and dry. Capillary Refill: Capillary refill takes less than 2 seconds. Findings: Wound present. Neurological:      General: No focal deficit present. Mental Status: She is alert and oriented to person, place, and time. Psychiatric:         Attention and Perception: Attention normal.         Mood and Affect: Mood is anxious and depressed. Affect is labile. Speech: Speech normal.         Behavior: Behavior is agitated. Thought Content: Thought content is not delusional. Thought content does not include suicidal ideation. Thought content does not include homicidal or suicidal plan. Cognition and Memory: Cognition and memory normal.       /68   Pulse 118   Temp 97.2 °F (36.2 °C) (Temporal)   Resp 18   Wt 189 lb (85.7 kg)   LMP 09/29/2020   SpO2 98%   BMI 33.48 kg/m²     Assessment:       Diagnosis Orders   1.  Attention deficit hyperactivity disorder (ADHD), combined type amphetamine-dextroamphetamine (ADDERALL, 10MG,) 10 MG tablet   2. Mood disorder ([facility] [address]     3. Autism     4. Wound check, abscess               Plan:       Return in about 2 weeks (around 10/22/2020), or if symptoms worsen or fail to improve, for depression med check. No orders of the defined types were placed in this encounter. Orders Placed This Encounter   Medications    amphetamine-dextroamphetamine (ADDERALL, 10MG,) 10 MG tablet     Sig: Take 1 tablet by mouth 2 times daily for 30 days. Dispense:  60 tablet     Refill:  0    PARoxetine (PAXIL) 20 MG tablet     Sig: Take 1 tablet by mouth nightly     Dispense:  30 tablet     Refill:  11    buPROPion (WELLBUTRIN XL) 150 MG extended release tablet     Sig: Take 1 tablet by mouth every morning     Dispense:  30 tablet     Refill:  5    get in touch with Florentino Perez appt with psychology and psychiatry  Will try to get adderall approved she was on before and stop strattera but may need to see specialist to get approved based on insurance, failed strattera     In the mean time for mood ; start paxil at bedtime   Will do wellbutrin for 2 weeks until paxil was in the system and then can stop   Controlled substances monitoring: possible medication side effects, risk of tolerance and/or dependence, and alternative treatments discussed, no signs of potential drug abuse or diversion identified and OARRS report reviewed today- activity consistent with treatment plan. For wound stop doxy   Finish bactrim ; ie 5 days   If no improvement or not more than 70/80 percent will try I and D. F/u in one week   Call with q/c      Patientgiven educational materials - see patient instructions. Discussed use, benefit,and side effects of prescribed medications. All patient questions answered. Ptvoiced understanding. Reviewed health maintenance. Instructed to continue currentmedications, diet and exercise. Patient agreed with treatment plan. Follow up asdirected. Electronically signed by Dominick Chong DO on 10/8/2020 at 9:11 PM

## 2020-10-15 ENCOUNTER — OFFICE VISIT (OUTPATIENT)
Dept: FAMILY MEDICINE CLINIC | Age: 25
End: 2020-10-15
Payer: COMMERCIAL

## 2020-10-15 VITALS
OXYGEN SATURATION: 98 % | HEIGHT: 63 IN | RESPIRATION RATE: 18 BRPM | BODY MASS INDEX: 33.49 KG/M2 | HEART RATE: 113 BPM | DIASTOLIC BLOOD PRESSURE: 72 MMHG | TEMPERATURE: 98.2 F | SYSTOLIC BLOOD PRESSURE: 116 MMHG | WEIGHT: 189 LBS

## 2020-10-15 PROCEDURE — 99213 OFFICE O/P EST LOW 20 MIN: CPT | Performed by: INTERNAL MEDICINE

## 2020-10-15 RX ORDER — SULFAMETHOXAZOLE AND TRIMETHOPRIM 800; 160 MG/1; MG/1
1 TABLET ORAL 2 TIMES DAILY
Qty: 10 TABLET | Refills: 0 | Status: SHIPPED | OUTPATIENT
Start: 2020-10-15 | End: 2020-10-20

## 2020-10-16 ASSESSMENT — ENCOUNTER SYMPTOMS
COLOR CHANGE: 1
ABDOMINAL PAIN: 0
DIARRHEA: 0
CONSTIPATION: 0

## 2020-10-16 NOTE — PROGRESS NOTES
Hyperlipidemia     Candy-Parkinson-White (WPW) pattern       Past Surgical History:   Procedure Laterality Date    ATRIAL ABLATION SURGERY      due to WPW       Family History   Problem Relation Age of Onset    High Blood Pressure Mother     Seizures Father     Lung Cancer Paternal Grandmother     Lung Cancer Paternal Grandfather        Social History     Tobacco Use    Smoking status: Never Smoker    Smokeless tobacco: Never Used   Substance Use Topics    Alcohol use: Never     Frequency: Never      Current Outpatient Medications   Medication Sig Dispense Refill    sulfamethoxazole-trimethoprim (BACTRIM DS;SEPTRA DS) 800-160 MG per tablet Take 1 tablet by mouth 2 times daily for 5 days 10 tablet 0    amphetamine-dextroamphetamine (ADDERALL, 10MG,) 10 MG tablet Take 1 tablet by mouth 2 times daily for 30 days.  60 tablet 0    buPROPion (WELLBUTRIN XL) 150 MG extended release tablet Take 1 tablet by mouth every morning 30 tablet 5    benzoyl peroxide 5 % external liquid Wash face, chest and back and groin 1-2 times daily 227 g 11    ketoconazole (NIZORAL) 2 % shampoo Apply 3-4 times weekly to scalp, leave on for five minutes prior to washing off 120 mL 11    ibuprofen (ADVIL;MOTRIN) 800 MG tablet Take 1 tablet by mouth every 8 hours as needed for Pain 20 tablet 0    cyclobenzaprine (FLEXERIL) 10 MG tablet Take 1 tablet by mouth 3 times daily as needed for Muscle spasms 15 tablet 0    PARoxetine (PAXIL) 20 MG tablet Take 1 tablet by mouth nightly (Patient not taking: Reported on 10/15/2020) 30 tablet 11    drospirenone-ethinyl estradiol (MEDINA 28) 3-0.03 MG TABS Take 1 tablet by mouth daily (Patient not taking: Reported on 10/8/2020) 1 packet 12    tretinoin (RETIN-A) 0.025 % cream Apply pea sized amount to face, chest and back nightly (Patient not taking: Reported on 10/8/2020) 45 g 11    naproxen (NAPROSYN) 500 MG tablet Take 1 tablet by mouth 2 times daily as needed for Pain (Patient not taking: Reported on 8/13/2020) 60 tablet 5     No current facility-administered medications for this visit. Allergies   Allergen Reactions    Amoxicillin     Strawberry Extract Swelling          Health Maintenance   Topic Date Due    Varicella vaccine (1 of 2 - 2-dose childhood series) 11/11/1994    HPV vaccine (1 - 2-dose series) 11/11/2004    HIV screen  11/11/2008    DTaP/Tdap/Td vaccine (1 - Tdap) 11/11/2012    Cervical cancer screen  11/11/2014    Flu vaccine (1) 09/01/2020    Hepatitis A vaccine  Aged Out    Hepatitis B vaccine  Aged Out    Hib vaccine  Aged Out    Meningococcal (ACWY) vaccine  Aged Out    Pneumococcal 0-64 years Vaccine  Aged Out       Subjective:     Review of Systems   Constitutional: Negative for activity change, appetite change, chills, diaphoresis, fatigue, fever and unexpected weight change. Eyes: Negative for visual disturbance. Cardiovascular: Negative for chest pain, palpitations and leg swelling. Gastrointestinal: Negative for abdominal pain, constipation and diarrhea. Musculoskeletal: Negative for arthralgias. Skin: Positive for color change and wound. Negative for rash. Neurological: Negative for dizziness and headaches. Psychiatric/Behavioral: Positive for behavioral problems and decreased concentration. Negative for self-injury, sleep disturbance and suicidal ideas. The patient is nervous/anxious and is hyperactive. Objective:      Physical Exam  Vitals signs and nursing note reviewed. Constitutional:       General: She is not in acute distress. Appearance: She is obese. She is not ill-appearing, toxic-appearing or diaphoretic. HENT:      Head: Normocephalic and atraumatic. Skin:     General: Skin is warm and dry. Findings: Abrasion, abscess and wound present. Neurological:      Mental Status: She is alert. Psychiatric:         Attention and Perception: Attention normal.         Mood and Affect: Mood is anxious. Speech: Speech normal.         Behavior: Behavior normal.         Cognition and Memory: Cognition and memory normal.       /72   Pulse 113   Temp 98.2 °F (36.8 °C) (Temporal)   Resp 18   Ht 5' 3\" (1.6 m)   Wt 189 lb (85.7 kg)   LMP 09/29/2020   SpO2 98%   BMI 33.48 kg/m²     Assessment:       Diagnosis Orders   1. Attention deficit hyperactivity disorder (ADHD), combined type     2. [facility]     3. Wound check, abscess               Plan:       Return in about 4 weeks (around 11/12/2020), or if symptoms worsen or fail to improve, for depression med check. No orders of the defined types were placed in this encounter. Orders Placed This Encounter   Medications    sulfamethoxazole-trimethoprim (BACTRIM DS;SEPTRA DS) 800-160 MG per tablet     Sig: Take 1 tablet by mouth 2 times daily for 5 days     Dispense:  10 tablet     Refill:  0    attempted to open on bloody drainage, minimal   Continue wound care   Will do 5 more days of bactrim to cover a full 2 weeks     Follow up in 4 weeks for med check   Start paxil daily at bedtime   Reviewed SE  Follow up with counselor   Call with q/c    Patientgiven educational materials - see patient instructions. Discussed use, benefit,and side effects of prescribed medications. All patient questions answered. Ptvoiced understanding. Reviewed health maintenance. Instructed to continue currentmedications, diet and exercise. Patient agreed with treatment plan. Follow up asdirected.      Electronically signed by Amaya Jimenez DO on 10/16/2020 at 2:34 PM

## 2020-10-29 PROBLEM — D48.5 NEOPLASM OF UNCERTAIN BEHAVIOR OF SKIN: Status: ACTIVE | Noted: 2020-10-29

## 2020-11-05 ENCOUNTER — HOSPITAL ENCOUNTER (OUTPATIENT)
Dept: PREADMISSION TESTING | Age: 25
Setting detail: SPECIMEN
Discharge: HOME | End: 2020-11-09
Payer: COMMERCIAL

## 2020-11-05 LAB
SARS-COV-2, RAPID: NORMAL
SARS-COV-2: NORMAL
SARS-COV-2: NOT DETECTED
SOURCE: NORMAL

## 2020-11-05 PROCEDURE — U0003 INFECTIOUS AGENT DETECTION BY NUCLEIC ACID (DNA OR RNA); SEVERE ACUTE RESPIRATORY SYNDROME CORONAVIRUS 2 (SARS-COV-2) (CORONAVIRUS DISEASE [COVID-19]), AMPLIFIED PROBE TECHNIQUE, MAKING USE OF HIGH THROUGHPUT TECHNOLOGIES AS DESCRIBED BY CMS-2020-01-R: HCPCS

## 2020-11-09 ENCOUNTER — HOSPITAL ENCOUNTER (OUTPATIENT)
Age: 25
Setting detail: OUTPATIENT SURGERY
Discharge: HOME | End: 2020-11-09
Attending: PLASTIC SURGERY | Admitting: PLASTIC SURGERY
Payer: COMMERCIAL

## 2020-11-09 VITALS
DIASTOLIC BLOOD PRESSURE: 87 MMHG | HEART RATE: 93 BPM | RESPIRATION RATE: 15 BRPM | WEIGHT: 189 LBS | HEIGHT: 63 IN | BODY MASS INDEX: 33.49 KG/M2 | TEMPERATURE: 97.6 F | OXYGEN SATURATION: 97 % | SYSTOLIC BLOOD PRESSURE: 133 MMHG

## 2020-11-09 LAB — HCG, PREGNANCY URINE (POC): NEGATIVE

## 2020-11-09 PROCEDURE — 81025 URINE PREGNANCY TEST: CPT

## 2020-11-09 PROCEDURE — 3600000012 HC SURGERY LEVEL 2 ADDTL 15MIN: Performed by: PLASTIC SURGERY

## 2020-11-09 PROCEDURE — 88305 TISSUE EXAM BY PATHOLOGIST: CPT

## 2020-11-09 PROCEDURE — 2709999900 HC NON-CHARGEABLE SUPPLY: Performed by: PLASTIC SURGERY

## 2020-11-09 PROCEDURE — 2500000003 HC RX 250 WO HCPCS: Performed by: PLASTIC SURGERY

## 2020-11-09 PROCEDURE — 7100000010 HC PHASE II RECOVERY - FIRST 15 MIN: Performed by: PLASTIC SURGERY

## 2020-11-09 PROCEDURE — 3600000002 HC SURGERY LEVEL 2 BASE: Performed by: PLASTIC SURGERY

## 2020-11-09 RX ORDER — LIDOCAINE HYDROCHLORIDE 10 MG/ML
INJECTION, SOLUTION INFILTRATION; PERINEURAL
Status: DISCONTINUED
Start: 2020-11-09 | End: 2020-11-09 | Stop reason: HOSPADM

## 2020-11-09 RX ORDER — LIDOCAINE HYDROCHLORIDE AND EPINEPHRINE 10; 10 MG/ML; UG/ML
INJECTION, SOLUTION INFILTRATION; PERINEURAL
Status: DISCONTINUED
Start: 2020-11-09 | End: 2020-11-09 | Stop reason: HOSPADM

## 2020-11-09 RX ORDER — BUPIVACAINE HYDROCHLORIDE 2.5 MG/ML
INJECTION, SOLUTION EPIDURAL; INFILTRATION; INTRACAUDAL
Status: DISCONTINUED
Start: 2020-11-09 | End: 2020-11-09 | Stop reason: WASHOUT

## 2020-11-09 RX ORDER — LIDOCAINE HYDROCHLORIDE AND EPINEPHRINE 10; 10 MG/ML; UG/ML
INJECTION, SOLUTION INFILTRATION; PERINEURAL PRN
Status: DISCONTINUED | OUTPATIENT
Start: 2020-11-09 | End: 2020-11-09 | Stop reason: ALTCHOICE

## 2020-11-09 ASSESSMENT — PAIN - FUNCTIONAL ASSESSMENT: PAIN_FUNCTIONAL_ASSESSMENT: 0-10

## 2020-11-09 ASSESSMENT — PAIN SCALES - GENERAL: PAINLEVEL_OUTOF10: 0

## 2020-11-09 NOTE — H&P
Patient ID: Esau Amaya is a 30 y.o. female. HPI   Patient comes in today to have 2 moles evaluated one on her L hand and L hip/buttocks. She is quite bothered by these. She denies any family Hx of skin CA. She says the one on the L hand has grown over the years, it once was flat but is raised not. She says it is sensitive when scratched. Review of Systems   Constitutional: Negative. HENT: Negative for congestion and trouble swallowing. Respiratory: Negative for cough and shortness of breath. Cardiovascular: Negative for chest pain. Gastrointestinal: Negative for abdominal pain. Neurological: Negative for light-headedness and headaches. Psychiatric/Behavioral: Negative for dysphoric mood. Medical History     Medical History     Diagnosis  Date  Comment  Source    Depression  10/2020  Suicidal Ideation  Provider    Congenital benign skin mole   left hand  Provider    ADHD    Provider       Other Medical History     Diagnosis  Date  Comment  Source    Candy-Parkinson-White (WPW) pattern   Had ablation 09/2012  Provider    Seborrheic dermatitis    Provider    Hypopigmented skin lesion   left hip  Provider    Hyperlipidemia    Provider    Acne vulgaris    Provider       Family History     Problem  Relation  Age of Onset  Comments    High Blood Pressure  Mother      Lung Cancer  Paternal Grandfather      Lung Cancer  Paternal Grandmother      Seizures  Father      Social History     Tobacco History     Smoking Status   Never Smoker    Smokeless Tobacco Use   Never Used    Alcohol History     Alcohol Use Status   Never    Drug Use     Drug Use Status   Never    Sexual Activity     Sexually Active   Never     Surgical History     Procedure  Laterality  Date  Comment  Source    ATRIAL ABLATION SURGERY   09/2012  due to WPW  Provider    E-Cigarettes Vaping or Juuling     Questions    Vaping Use    Responses: Never User    Start Date    Does device contain nicotine?     Responses: Never    Quit Date Vaping Type    Socioeconomic History     Employment History     No employment history on file. Family and Education     Marital Status    Single    Social Identity     Preferred Language  Ethnicity  Race    English  Non-/Non   White       Financial Resource Strain     No financial resource strain value on [employer] Insecurity     No food insecurity information on file    Transportation Needs     No transportation needs information on file         Medications Prior Authorizations    clindamycin (CLEOCIN) 150 MG capsule Take one PO QID. Start one day prior to surgery. amphetamine-dextroamphetamine (ADDERALL, 10MG,) 10 MG tablet () Take 1 tablet by mouth 2 times daily for 30 days. PARoxetine (PAXIL) 20 MG tablet Take 1 tablet by mouth nightly  Patient not taking: Reported on 10/15/2020    buPROPion (WELLBUTRIN XL) 150 MG extended release tablet Take 1 tablet by mouth every morning    drospirenone-ethinyl estradiol (MEDINA 28) 3-0.03 MG TABS Take 1 tablet by mouth daily  Patient not taking: Reported on 10/8/2020    benzoyl peroxide 5 % external liquid Wash face, chest and back and groin 1-2 times daily    tretinoin (RETIN-A) 0.025 % cream Apply pea sized amount to face, chest and back nightly  Patient not taking: Reported on 10/8/2020    ketoconazole (NIZORAL) 2 % shampoo Apply 3-4 times weekly to scalp, leave on for five minutes prior to washing off    ibuprofen (ADVIL;MOTRIN) 800 MG tablet Take 1 tablet by mouth every 8 hours as needed for Pain    cyclobenzaprine (FLEXERIL) 10 MG tablet Take 1 tablet by mouth 3 times daily as needed for Muscle spasms    naproxen (NAPROSYN) 500 MG tablet Take 1 tablet by mouth 2 times daily as needed for Pain  Patient not taking: Reported on 2020           Objective:   Physical Exam   Vitals signs and nursing note reviewed. Exam conducted with a chaperone present. Constitutional:   Appearance: Normal appearance.    HENT:   Head: Normocephalic and atraumatic. Mouth/Throat:   Mouth: Mucous membranes are moist.   Eyes:   Extraocular Movements: Extraocular movements intact. Conjunctiva/sclera: Conjunctivae normal.   Pupils: Pupils are equal, round, and reactive to light. Pulmonary:   Effort: Pulmonary effort is normal.   Musculoskeletal:   Hands:    Legs:    Skin:   General: Skin is warm and dry. Neurological:   General: No focal deficit present. Mental Status: She is alert and oriented to person, place, and time. Assessment:   Lesions. Plan:   Scheduled for excision of lesion left hand. I have discussed with the patient the indication, alternatives, and the possible risks and/or complications which include but are not limited to those delineated on the consent form for the planned procedure and the anesthesia methods. All questions were answered to patient's satisfaction. Consent was reviewed and signed.

## 2020-11-09 NOTE — BRIEF OP NOTE
Brief Postoperative Note      Patient: Shanique Cutler  YOB: 1995  MRN: [id number]    Date of Procedure: 11/9/2020    Pre-Op Diagnosis: LEFT HAND LESION    Post-Op Diagnosis: Same       Procedure(s):  HAND LESION BIOPSY EXCISION    Surgeon(s):  Castro Ruiz MD    Assistant:  * No surgical staff found *    Anesthesia: Local    Estimated Blood Loss (mL): Minimal    Complications: None    Specimens:   ID Type Source Tests Collected by Time Destination   A : LESION LEFT HAND Tissue Skin SURGICAL PATHOLOGY Castro Ruiz MD 11/9/2020 1105        Implants:  * No implants in log *      Drains: * No LDAs found *    Findings:     Electronically signed by Castro Ruiz MD on 11/9/2020 at 11:16 AM

## 2020-11-10 LAB — DERMATOLOGY PATHOLOGY REPORT: NORMAL

## 2020-11-12 ENCOUNTER — OFFICE VISIT (OUTPATIENT)
Dept: FAMILY MEDICINE CLINIC | Age: 25
End: 2020-11-12
Payer: COMMERCIAL

## 2020-11-12 VITALS
DIASTOLIC BLOOD PRESSURE: 78 MMHG | SYSTOLIC BLOOD PRESSURE: 118 MMHG | HEIGHT: 63 IN | OXYGEN SATURATION: 98 % | HEART RATE: 80 BPM | TEMPERATURE: 97.4 F | RESPIRATION RATE: 18 BRPM | BODY MASS INDEX: 34.91 KG/M2 | WEIGHT: 197 LBS

## 2020-11-12 PROCEDURE — 99213 OFFICE O/P EST LOW 20 MIN: CPT | Performed by: INTERNAL MEDICINE

## 2020-11-12 RX ORDER — ATOMOXETINE 40 MG/1
40 CAPSULE ORAL DAILY
Qty: 30 CAPSULE | Refills: 3 | Status: SHIPPED | OUTPATIENT
Start: 2020-11-12 | End: 2021-03-19 | Stop reason: SDUPTHER

## 2020-11-12 RX ORDER — PAROXETINE HYDROCHLORIDE 20 MG/1
20 TABLET, FILM COATED ORAL NIGHTLY
Qty: 30 TABLET | Refills: 11 | Status: SHIPPED | OUTPATIENT
Start: 2020-11-12 | End: 2021-03-29 | Stop reason: ALTCHOICE

## 2020-11-12 ASSESSMENT — ENCOUNTER SYMPTOMS
STRIDOR: 0
CHOKING: 0
ABDOMINAL PAIN: 0
CHEST TIGHTNESS: 0
DIARRHEA: 0
WHEEZING: 0
CONSTIPATION: 0
SHORTNESS OF BREATH: 0
COUGH: 0

## 2020-11-12 NOTE — PROGRESS NOTES
[address] [facility]  [address]  [facility] [address] [address]  Dept: [phone]  Dept Fax: [phone]    Antonia Bach a 30 y.o. female who presents today for her medical conditions/complaints as notedbelow. Anitha Contreras is c/o of   Chief Complaint   Patient presents with    Medication Check    Wound Check     left hand       HPI:     Feeling much better on medications  Sleeping very well on the paxil for the most part  Mood much better  Pharmacy is still refilled the wellbutrin though as on auto refill  No si/hi   Is back to work   Has psychiatrist but has only seen once    They still will not fill the adderall   Has been out of strattera for 2 weeks now     Had surgery on birth gege this monday   Will not have sutures out fo rover 3 weeks due to thanksgiving ie dec 1s   Steri strips came off the next day   They did not give post op wound care instructions so she has been covering with gauze   Open at home though only covered when out and at work         Wound Check   She was originally treated 3 to 5 days ago. Previous treatment included wound cleansing or irrigation and laceration repair. Her temperature was unmeasured prior to arrival. There has been no drainage from the wound. There is no redness present. There is no swelling present. The pain has improved.        Hemoglobin A1C (%)   Date Value   01/13/2020 5.1         ( goal A1C is < 7)   No results found for: LABMICR  No results found for: LDLCHOLESTEROL, LDLCALC    (goal LDL is <100)   AST (U/L)   Date Value   01/13/2020 23     ALT (U/L)   Date Value   01/13/2020 31     BUN (mg/dL)   Date Value   01/13/2020 12     BP Readings from Last 3 Encounters:   11/12/20 118/78   11/09/20 133/87   10/15/20 116/72          (goal 120/80)    Past Medical History:   Diagnosis Date    Acne vulgaris     ADHD     Congenital benign skin mole     left hand    Depression 10/2020    Suicidal Ideation    Hyperlipidemia     Hypopigmented skin lesion     left hip    Seborrheic dermatitis     Candy-Parkinson-White (WPW) pattern     Had ablation 09/2012      Past Surgical History:   Procedure Laterality Date    ATRIAL ABLATION SURGERY  09/2012    due to WPW    EXCISION/BIOPSY Left 11/09/2020    HAND LESION BIOPSY EXCISION - Left    HAND SURGERY Left 11/9/2020    HAND LESION BIOPSY EXCISION performed by Vick Iniguez MD at [address] [facility].       Family History   Problem Relation Age of Onset    High Blood Pressure Mother     Seizures Father     Lung Cancer Paternal Grandmother     Lung Cancer Paternal Grandfather        Social History     Tobacco Use    Smoking status: Never Smoker    Smokeless tobacco: Never Used   Substance Use Topics    Alcohol use: Never     Frequency: Never      Current Outpatient Medications   Medication Sig Dispense Refill    PARoxetine (PAXIL) 20 MG tablet Take 1 tablet by mouth nightly 30 tablet 11    buPROPion (WELLBUTRIN XL) 150 MG extended release tablet Take 1 tablet by mouth every morning 30 tablet 5    drospirenone-ethinyl estradiol (MEDINA 28) 3-0.03 MG TABS Take 1 tablet by mouth daily 1 packet 12    ketoconazole (NIZORAL) 2 % shampoo Apply 3-4 times weekly to scalp, leave on for five minutes prior to washing off 120 mL 11    naproxen (NAPROSYN) 500 MG tablet Take 1 tablet by mouth 2 times daily as needed for Pain 60 tablet 5    amphetamine-dextroamphetamine (ADDERALL, 10MG,) 10 MG tablet Take 1 tablet by mouth 2 times daily for 30 days.  60 tablet 0    benzoyl peroxide 5 % external liquid Wash face, chest and back and groin 1-2 times daily (Patient not taking: Reported on 11/12/2020) 227 g 11    tretinoin (RETIN-A) 0.025 % cream Apply pea sized amount to face, chest and back nightly (Patient not taking: Reported on 11/12/2020) 45 g 11    ibuprofen (ADVIL;MOTRIN) 800 MG tablet Take 1 tablet by mouth every 8 hours as needed for Pain 20 tablet 0     No current facility-administered medications for this visit. Allergies   Allergen Reactions    Amoxicillin     Strawberry Extract Swelling          Health Maintenance   Topic Date Due    Varicella vaccine (1 of 2 - 2-dose childhood series) 11/11/1994    HIV screen  11/11/2008    DTaP/Tdap/Td vaccine (1 - Tdap) 11/11/2012    Cervical cancer screen  11/11/2014    Flu vaccine (1) 09/01/2020    Hepatitis A vaccine  Aged Out    Hepatitis B vaccine  Aged Out    Hib vaccine  Aged Out    Meningococcal (ACWY) vaccine  Aged Out    Pneumococcal 0-64 years Vaccine  Aged Out       Subjective:     Review of Systems   Constitutional: Negative for activity change, appetite change, fatigue, fever and unexpected weight change. Eyes: Negative for visual disturbance. Respiratory: Negative for cough, choking, chest tightness, shortness of breath, wheezing and stridor. Cardiovascular: Negative for chest pain. Gastrointestinal: Negative for abdominal pain, constipation and diarrhea. Musculoskeletal: Negative for arthralgias. Skin: Positive for wound. Negative for rash. Neurological: Negative for headaches. Psychiatric/Behavioral: Positive for decreased concentration. Negative for dysphoric mood, hallucinations, self-injury, sleep disturbance and suicidal ideas. The patient is hyperactive. The patient is not nervous/anxious. Objective:      Physical Exam  Vitals signs and nursing note reviewed. Constitutional:       General: She is not in acute distress. Appearance: She is obese. She is not ill-appearing, toxic-appearing or diaphoretic. HENT:      Head: Normocephalic and atraumatic. Neck:      Musculoskeletal: Neck supple. Cardiovascular:      Rate and Rhythm: Normal rate and regular rhythm. Pulmonary:      Effort: Pulmonary effort is normal.      Breath sounds: Normal breath sounds. Neurological:      General: No focal deficit present.       Mental Status: She is alert and oriented to person, place, and time. Psychiatric:         Mood and Affect: Mood normal.         Speech: Speech is rapid and pressured. Behavior: Behavior is hyperactive. Thought Content: Thought content normal.         Cognition and Memory: Cognition normal.         Judgment: Judgment normal.       /78   Pulse 80   Temp 97.4 °F (36.3 °C) (Temporal)   Resp 18   Ht 5' 3\" (1.6 m)   Wt 197 lb (89.4 kg)   SpO2 98%   BMI 34.90 kg/m²     Assessment:       Diagnosis Orders   1. Attention deficit hyperactivity disorder (ADHD), combined type     2. Autism     3. Mood disorder ([facility] [address]     4. Panic disorder     5. Encounter for post surgical wound check               Plan:       Return in about 1 week (around 11/19/2020), or if symptoms worsen or fail to improve, for staple removal .    No orders of the defined types were placed in this encounter. No orders of the defined types were placed in this encounter. refilled medications   Restart strattera  Get paper work to us from therapist in regards to AD so we can send to insurance for [place] for adderall   Continue paxil 20 mg daily  Stop wellbutrin completely   Wound appears to be well healing     Will fu in 1.5 weeks ie nov 24th for suture removal here so to not delay . Call with q/c      Patientgiven educational materials - see patient instructions. Discussed use, benefit,and side effects of prescribed medications. All patient questions answered. Ptvoiced understanding. Reviewed health maintenance. Instructed to continue currentmedications, diet and exercise. Patient agreed with treatment plan. Follow up asdirected.      Electronically signed by Tung Barreto DO on 11/12/2020 at 4:32 PM

## 2020-11-13 NOTE — OP NOTE
[address] [facility] [address]                                OPERATIVE REPORT    PATIENT NAME: Ekaterina Quiñones                     :        1995  MED REC NO:   [id number]                             ROOM:  ACCOUNT NO:   [de-identified]                           ADMIT DATE: 2020  PROVIDER:     Sohan Lagos    DATE OF PROCEDURE:  2020    ATTENDING PHYSICIAN AND SURGEON:  Sohan Lagos MD    PREOPERATIVE DIAGNOSIS:  Lesion, left hand. POSTOPERATIVE DIAGNOSIS:  Lesion, left hand. PROCEDURE:  Excision of lesion of left hand (2.0 cm), taken with a 3-mm  margin, and with intermediate repair of 5.0 cm length. ANESTHESIA:  Local 1% Xylocaine with epinephrine, buffered. INDICATIONS:  The patient is a 57-year-old female with an irregular  lesion on the dorsal left hand, here for removal and diagnosis. The  procedure, the risks, the benefits were discussed with her. All  questions were answered to her satisfaction. Consent was signed. NARRATIVE SUMMARY:  The patient was brought to the operating suite,  placed in the supine position. Left arm was prepped and draped in the  usual sterile fashion. Following this, outline was made for excision  along with margin. Local anesthetic was infiltrated. Next, with a scalpel, incision was carried out around the perimeter of  the margin and through the full-thickness of the skin. Then, with sharp  and blunt scissor dissection, it was elevated up off the hand and passed  off as specimen. Bovie cautery was used for hemostasis. The wound was  then closed in layers with interrupted 4-0 Vicryl in the subcutaneous,  running intracuticular 4-0 Prolene in the skin. Steri-Strips for  dressing. The patient tolerated the procedure well. Blood loss,  minimal.  Specimens x1. Complications, none.   The patient was  transferred to recovery in stable

## 2020-11-24 ENCOUNTER — OFFICE VISIT (OUTPATIENT)
Dept: FAMILY MEDICINE CLINIC | Age: 25
End: 2020-11-24
Payer: COMMERCIAL

## 2020-11-24 VITALS
BODY MASS INDEX: 34.91 KG/M2 | OXYGEN SATURATION: 98 % | WEIGHT: 197 LBS | HEART RATE: 103 BPM | DIASTOLIC BLOOD PRESSURE: 78 MMHG | SYSTOLIC BLOOD PRESSURE: 118 MMHG | RESPIRATION RATE: 18 BRPM | HEIGHT: 63 IN

## 2020-11-24 PROCEDURE — 99213 OFFICE O/P EST LOW 20 MIN: CPT | Performed by: INTERNAL MEDICINE

## 2020-11-24 ASSESSMENT — ENCOUNTER SYMPTOMS
COLOR CHANGE: 0
CONSTIPATION: 0
ABDOMINAL PAIN: 0
DIARRHEA: 0

## 2020-11-24 NOTE — PROGRESS NOTES
[address] [facility]  [address]  [facility] [address] [address]  Dept: [phone]  Dept Fax: [phone]    Nathaniel Cabello a 30 y.o. female who presents today for her medical conditions/complaints as notedbelow. [facility] is c/o of   Chief Complaint   Patient presents with    Suture / Staple Removal         HPI:     Wound Check   She was originally treated 10 to 14 days ago. Previous treatment included wound cleansing or irrigation. Her temperature was unmeasured prior to arrival. There has been no drainage from the wound. The redness has improved. The swelling has improved. The pain has improved. There is difficulty moving the extremity or digit due to pain.        Hemoglobin A1C (%)   Date Value   01/13/2020 5.1         ( goal A1C is < 7)   No results found for: LABMICR  No results found for: LDLCHOLESTEROL, LDLCALC    (goal LDL is <100)   AST (U/L)   Date Value   01/13/2020 23     ALT (U/L)   Date Value   01/13/2020 31     BUN (mg/dL)   Date Value   01/13/2020 12     BP Readings from Last 3 Encounters:   11/24/20 118/78   11/12/20 118/78   11/09/20 133/87          (goal 120/80)    Past Medical History:   Diagnosis Date    Acne vulgaris     ADHD     Congenital benign skin mole     left hand    Depression 10/2020    Suicidal Ideation    Hyperlipidemia     Hypopigmented skin lesion     left hip    Seborrheic dermatitis     Candy-Parkinson-White (WPW) pattern     Had ablation 09/2012      Past Surgical History:   Procedure Laterality Date    ATRIAL ABLATION SURGERY  09/2012    due to WPW    EXCISION/BIOPSY Left 11/09/2020    HAND LESION BIOPSY EXCISION - Left    HAND SURGERY Left 11/9/2020    HAND LESION BIOPSY EXCISION performed by Yevgeniy Valadez MD at [address] [facility].       Family History   Problem Relation Age of Onset    High Blood Pressure Mother     Seizures Father     Lung Cancer Paternal Grandmother     Lung Cancer Paternal Grandfather        Social History     Tobacco Use    Smoking status: Never Smoker    Smokeless tobacco: Never Used   Substance Use Topics    Alcohol use: Never     Frequency: Never      Current Outpatient Medications   Medication Sig Dispense Refill    atomoxetine (STRATTERA) 40 MG capsule Take 1 capsule by mouth daily 30 capsule 3    PARoxetine (PAXIL) 20 MG tablet Take 1 tablet by mouth nightly 30 tablet 11    drospirenone-ethinyl estradiol (MEDINA 28) 3-0.03 MG TABS Take 1 tablet by mouth daily 1 packet 12    ketoconazole (NIZORAL) 2 % shampoo Apply 3-4 times weekly to scalp, leave on for five minutes prior to washing off 120 mL 11    ibuprofen (ADVIL;MOTRIN) 800 MG tablet Take 1 tablet by mouth every 8 hours as needed for Pain 20 tablet 0    naproxen (NAPROSYN) 500 MG tablet Take 1 tablet by mouth 2 times daily as needed for Pain 60 tablet 5    amphetamine-dextroamphetamine (ADDERALL, 10MG,) 10 MG tablet Take 1 tablet by mouth 2 times daily for 30 days. 60 tablet 0    benzoyl peroxide 5 % external liquid Wash face, chest and back and groin 1-2 times daily (Patient not taking: Reported on 11/12/2020) 227 g 11    tretinoin (RETIN-A) 0.025 % cream Apply pea sized amount to face, chest and back nightly (Patient not taking: Reported on 11/12/2020) 45 g 11     No current facility-administered medications for this visit.       Allergies   Allergen Reactions    Amoxicillin     Strawberry Extract Swelling          Health Maintenance   Topic Date Due    Varicella vaccine (1 of 2 - 2-dose childhood series) 11/11/1994    HIV screen  11/11/2008    DTaP/Tdap/Td vaccine (1 - Tdap) 11/11/2012    Cervical cancer screen  11/11/2014    Flu vaccine (1) 09/01/2020    Hepatitis A vaccine  Aged Out    Hepatitis B vaccine  Aged Out    Hib vaccine  Aged Out    Meningococcal (ACWY) vaccine  Aged Out    Pneumococcal 0-64 years Vaccine  Aged Out       Subjective:     Review of Systems   Constitutional: Negative for activity change, appetite change, chills, fatigue, fever and unexpected weight change. Eyes: Negative for visual disturbance. Cardiovascular: Negative for chest pain. Gastrointestinal: Negative for abdominal pain, constipation and diarrhea. Musculoskeletal: Negative for arthralgias. Skin: Positive for wound. Negative for color change and rash. Neurological: Negative for headaches. Psychiatric/Behavioral: Negative for sleep disturbance. Objective:      Physical Exam  Vitals signs and nursing note reviewed. Constitutional:       General: She is not in acute distress. Appearance: She is not ill-appearing, toxic-appearing or diaphoretic. HENT:      Head: Normocephalic and atraumatic. Skin:     General: Skin is warm and dry. Findings: Wound present. Neurological:      Mental Status: She is alert. /78   Pulse 103   Resp 18   Ht 5' 3\" (1.6 m)   Wt 197 lb (89.4 kg)   SpO2 98%   BMI 34.90 kg/m²     Assessment:       Diagnosis Orders   1. Visit for suture removal     2. Encounter for post surgical wound check               Plan:       No follow-ups on file. No orders of the defined types were placed in this encounter. No orders of the defined types were placed in this encounter. Patient presents for suture removal. The wound is well healed without signs of infection. The sutures are removed. Wound care and activity instructions given. Return prn. Patientgiven educational materials - see patient instructions. Discussed use, benefit,and side effects of prescribed medications. All patient questions answered. Ptvoiced understanding. Reviewed health maintenance. Instructed to continue currentmedications, diet and exercise. Patient agreed with treatment plan. Follow up asdirected.      Electronically signed by Otis Patino DO on 11/24/2020 at 4:26 PM

## 2020-11-30 ENCOUNTER — OFFICE VISIT (OUTPATIENT)
Dept: OBGYN CLINIC | Age: 25
End: 2020-11-30
Payer: COMMERCIAL

## 2020-11-30 VITALS
SYSTOLIC BLOOD PRESSURE: 111 MMHG | TEMPERATURE: 97.3 F | WEIGHT: 199.38 LBS | DIASTOLIC BLOOD PRESSURE: 75 MMHG | BODY MASS INDEX: 35.33 KG/M2 | HEART RATE: 88 BPM | HEIGHT: 63 IN

## 2020-11-30 PROBLEM — J45.909 ASTHMA: Status: ACTIVE | Noted: 2020-11-30

## 2020-11-30 PROBLEM — R45.851 SUICIDAL IDEATION: Status: ACTIVE | Noted: 2020-10-04

## 2020-11-30 PROBLEM — I45.6 WOLFF-PARKINSON-WHITE SYNDROME: Status: ACTIVE | Noted: 2020-11-30

## 2020-11-30 PROBLEM — E78.00 HYPERCHOLESTEREMIA: Status: ACTIVE | Noted: 2020-11-30

## 2020-11-30 PROCEDURE — 99395 PREV VISIT EST AGE 18-39: CPT | Performed by: OBSTETRICS & GYNECOLOGY

## 2020-11-30 RX ORDER — DROSPIRENONE AND ETHINYL ESTRADIOL 0.03MG-3MG
1 KIT ORAL DAILY
Qty: 1 PACKET | Refills: 12 | Status: SHIPPED | OUTPATIENT
Start: 2020-11-30 | End: 2021-08-24

## 2020-11-30 ASSESSMENT — ENCOUNTER SYMPTOMS
COUGH: 0
BACK PAIN: 0
SHORTNESS OF BREATH: 0
ABDOMINAL PAIN: 0
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
## 2020-12-10 ENCOUNTER — OFFICE VISIT (OUTPATIENT)
Dept: FAMILY MEDICINE CLINIC | Age: 25
End: 2020-12-10
Payer: COMMERCIAL

## 2020-12-10 ENCOUNTER — HOSPITAL ENCOUNTER (OUTPATIENT)
Age: 25
Setting detail: SPECIMEN
Discharge: HOME | End: 2020-12-10
Payer: COMMERCIAL

## 2020-12-10 VITALS
RESPIRATION RATE: 18 BRPM | TEMPERATURE: 99 F | OXYGEN SATURATION: 99 % | WEIGHT: 199 LBS | BODY MASS INDEX: 35.26 KG/M2 | HEIGHT: 63 IN | HEART RATE: 110 BPM

## 2020-12-10 LAB — S PYO AG THROAT QL: POSITIVE

## 2020-12-10 PROCEDURE — 87880 STREP A ASSAY W/OPTIC: CPT | Performed by: NURSE PRACTITIONER

## 2020-12-10 PROCEDURE — 99202 OFFICE O/P NEW SF 15 MIN: CPT | Performed by: NURSE PRACTITIONER

## 2020-12-10 RX ORDER — AZITHROMYCIN 250 MG/1
250 TABLET, FILM COATED ORAL SEE ADMIN INSTRUCTIONS
Qty: 6 TABLET | Refills: 0 | Status: SHIPPED | OUTPATIENT
Start: 2020-12-10 | End: 2020-12-15

## 2020-12-10 ASSESSMENT — ENCOUNTER SYMPTOMS
VOMITING: 0
SORE THROAT: 1
COUGH: 1
ABDOMINAL PAIN: 0
RHINORRHEA: 1
DIARRHEA: 0
SHORTNESS OF BREATH: 0
SINUS PAIN: 0
NAUSEA: 0

## 2020-12-10 NOTE — PATIENT INSTRUCTIONS

## 2020-12-10 NOTE — PROGRESS NOTES
improve. Orders Placed This Encounter   Medications    azithromycin (ZITHROMAX) 250 MG tablet     Sig: Take 1 tablet by mouth See Admin Instructions for 5 days 500mg on day 1 followed by 250mg on days 2 - 5     Dispense:  6 tablet     Refill:  0       Wells Score      PE  Calf swelling/tenderness +3 0  Tachycardia + 1.5 1.5  Immobilization +1.5 (car, plane, bedridden) 0  Recent surgery +1.5 0  History of DVT/ PE +1.5 0  Hemoptysis +1 0  Cancer +1 0    0-1= low probability  2-6= moderate probability  7+= high probability    Based on score of 1.5 patient would be considered low risk    Results for orders placed or performed in visit on 12/10/20   POCT rapid strep A   Result Value Ref Range    Strep A Ag Positive (A) None Detected     Patient instructed to complete entire antibiotic course. Tylenol/Motrin as needed for fever/discomfort. Change toothbrush in 24 hours. Salt water gargles and throat lozenges if desired. Patient agreeable to treatment plan. Educational materials provided on AVS.  Follow up if symptoms do not improve/worsen. Covid swab sent             Patient given educational materials - see patient instructions. Discussed use, benefit, and side effects of prescribed medications. All patientquestions answered. Pt voiced understanding. This note was transcribed using dictation with Dragon services. Efforts were made to correct any errors but some words may be misinterpreted.      Electronically signed by JOSE Ortega CNP on 12/10/2020at 7:05 PM

## 2020-12-12 LAB — SARS-COV-2, NAA: NOT DETECTED

## 2021-03-19 DIAGNOSIS — F90.2 ATTENTION DEFICIT HYPERACTIVITY DISORDER (ADHD), COMBINED TYPE: ICD-10-CM

## 2021-03-19 RX ORDER — DEXTROAMPHETAMINE SACCHARATE, AMPHETAMINE ASPARTATE, DEXTROAMPHETAMINE SULFATE AND AMPHETAMINE SULFATE 2.5; 2.5; 2.5; 2.5 MG/1; MG/1; MG/1; MG/1
10 TABLET ORAL 2 TIMES DAILY
Qty: 60 TABLET | Refills: 0 | Status: SHIPPED | OUTPATIENT
Start: 2021-03-19 | End: 2021-04-22 | Stop reason: SDUPTHER

## 2021-03-19 RX ORDER — ATOMOXETINE 40 MG/1
40 CAPSULE ORAL DAILY
Qty: 30 CAPSULE | Refills: 3 | Status: SHIPPED | OUTPATIENT
Start: 2021-03-19 | End: 2021-03-29 | Stop reason: ALTCHOICE

## 2021-03-29 ENCOUNTER — OFFICE VISIT (OUTPATIENT)
Dept: FAMILY MEDICINE CLINIC | Age: 26
End: 2021-03-29
Payer: COMMERCIAL

## 2021-03-29 VITALS
HEART RATE: 112 BPM | BODY MASS INDEX: 37.03 KG/M2 | OXYGEN SATURATION: 98 % | WEIGHT: 209 LBS | DIASTOLIC BLOOD PRESSURE: 100 MMHG | HEIGHT: 63 IN | RESPIRATION RATE: 18 BRPM | SYSTOLIC BLOOD PRESSURE: 150 MMHG

## 2021-03-29 DIAGNOSIS — R63.5 WEIGHT GAIN: ICD-10-CM

## 2021-03-29 DIAGNOSIS — F39 MOOD DISORDER (HCC): ICD-10-CM

## 2021-03-29 DIAGNOSIS — F84.0 AUTISM: ICD-10-CM

## 2021-03-29 DIAGNOSIS — R03.0 ELEVATED BLOOD PRESSURE READING: ICD-10-CM

## 2021-03-29 DIAGNOSIS — F41.0 PANIC DISORDER: Primary | ICD-10-CM

## 2021-03-29 PROCEDURE — 99214 OFFICE O/P EST MOD 30 MIN: CPT | Performed by: INTERNAL MEDICINE

## 2021-03-29 RX ORDER — BUSPIRONE HYDROCHLORIDE 10 MG/1
10 TABLET ORAL 3 TIMES DAILY PRN
Qty: 90 TABLET | Refills: 5 | Status: SHIPPED | OUTPATIENT
Start: 2021-03-29 | End: 2021-04-28

## 2021-03-29 RX ORDER — VENLAFAXINE HYDROCHLORIDE 75 MG/1
150 CAPSULE, EXTENDED RELEASE ORAL DAILY
Qty: 30 CAPSULE | Refills: 3 | Status: SHIPPED | OUTPATIENT
Start: 2021-03-29 | End: 2021-05-31 | Stop reason: SDUPTHER

## 2021-03-29 ASSESSMENT — PATIENT HEALTH QUESTIONNAIRE - PHQ9
SUM OF ALL RESPONSES TO PHQ QUESTIONS 1-9: 2
SUM OF ALL RESPONSES TO PHQ9 QUESTIONS 1 & 2: 2
SUM OF ALL RESPONSES TO PHQ QUESTIONS 1-9: 2

## 2021-03-29 ASSESSMENT — ENCOUNTER SYMPTOMS
SHORTNESS OF BREATH: 0
CHEST TIGHTNESS: 0
DIARRHEA: 0
CONSTIPATION: 0
COUGH: 0
CHOKING: 0
WHEEZING: 0
STRIDOR: 0
ABDOMINAL PAIN: 0

## 2021-03-29 NOTE — PROGRESS NOTES
Visit Information    Have you changed or started any medications since your last visit including any over-the-counter medicines, vitamins, or herbal medicines? no   Are you having any side effects from any of your medications? -  no  Have you stopped taking any of your medications? Is so, why? -  no    Have you seen any other physician or provider since your last visit? No  Have you had any other diagnostic tests since your last visit? No  Have you been seen in the emergency room and/or had an admission to a hospital since we last saw you? No  Have you had your routine dental cleaning in the past 6 months? no    Have you activated your [employer] account? If not, what are your barriers?  Yes     Patient Care Team:  Jodi Nichols DO as PCP - General (Family Medicine)  Jodi Nichols DO as PCP - [facility]    Medical History Review  Past Medical, Family, and Social History reviewed and does contribute to the patient presenting condition    Health Maintenance   Topic Date Due    Hepatitis C screen  Never done    Varicella vaccine (1 of 2 - 2-dose childhood series) Never done    Pneumococcal 0-64 years Vaccine (1 of 1 - PPSV23) Never done    HIV screen  Never done    COVID-19 Vaccine (1) Never done    DTaP/Tdap/Td vaccine (1 - Tdap) Never done    Cervical cancer screen  Never done    Flu vaccine (1) Never done    Hepatitis A vaccine  Aged Out    Hepatitis B vaccine  Aged Out    Hib vaccine  Aged Out    Meningococcal (ACWY) vaccine  Aged Out

## 2021-03-29 NOTE — PROGRESS NOTES
[facility]  [address] [place] [address]  Dept: [phone]  Dept Fax: [phone]    Catracho Harrington a 30 y.o. female who presents today for her medical conditions/complaints as notedbelow.   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, works part time   Has had the same job for a while now   Does not feel the paxil is working   Family feels she might be worse too   Crying more , irritable   No si/hi   She has been advised on psych before but has never set up appt but her mother wants her to see specialist   Also dealing with her daughters father who is in and out of the picture and still tries to have a relationship with her   She was on wellbutrin before this but cannot remember if it was better or worse or if it did anything   With panic attacks she is getting lightheaded , heart racing , chest pain         Hemoglobin A1C (%)   Date Value   01/13/2020 5.1         ( goal A1C is < 7)   No results found for: LABMICR  No results found for: LDLCHOLESTEROL, LDLCALC    (goal LDL is <100)   AST (U/L)   Date Value   01/13/2020 23     ALT (U/L)   Date Value   01/13/2020 31     BUN (mg/dL)   Date Value   01/13/2020 12     BP Readings from Last 3 Encounters:   03/29/21 (!) 150/100   12/01/20 111/71   11/30/20 111/75          (goal 120/80)    Past Medical History:   Diagnosis Date    Acne vulgaris     ADHD     Congenital benign skin mole     left hand    Depression 10/2020    Suicidal Ideation    Hyperlipidemia     Hypopigmented skin lesion     left hip    Seborrheic dermatitis     Candy-Parkinson-White (WPW) pattern     Had ablation 09/2012      Past Surgical History:   Procedure Laterality Date    ATRIAL ABLATION SURGERY  09/2012    due to WPW    EXCISION/BIOPSY Left 11/09/2020    HAND LESION BIOPSY EXCISION - Left    HAND SURGERY Left 11/9/2020    HAND LESION BIOPSY EXCISION performed by Tonio Barrera MD at [address] [facility].       Family History   Problem Relation Age of Onset    High Blood Pressure Mother     Seizures Father     Lung Cancer Paternal Grandmother     Lung Cancer Paternal Grandfather        Social History     Tobacco Use    Smoking status: Never Smoker    Smokeless tobacco: Never Used   Substance Use Topics    Alcohol use: Never     Frequency: Never      Current Outpatient Medications   Medication Sig Dispense Refill    venlafaxine (EFFEXOR XR) 75 MG extended release capsule Take 2 capsules by mouth daily 30 capsule 3    busPIRone (BUSPAR) 10 MG tablet Take 1 tablet by mouth 3 times daily as needed (panic attacks) 90 tablet 5    amphetamine-dextroamphetamine (ADDERALL, 10MG,) 10 MG tablet Take 1 tablet by mouth 2 times daily for 30 days. 60 tablet 0    drospirenone-ethinyl estradiol (MEDINA 28) 3-0.03 MG TABS Take 1 tablet by mouth daily 1 packet 12    benzoyl peroxide 5 % external liquid Wash face, chest and back and groin 1-2 times daily 227 g 11    tretinoin (RETIN-A) 0.025 % cream Apply pea sized amount to face, chest and back nightly 45 g 11    naproxen (NAPROSYN) 500 MG tablet Take 1 tablet by mouth 2 times daily as needed for Pain 60 tablet 5    ketoconazole (NIZORAL) 2 % shampoo Apply 3-4 times weekly to scalp, leave on for five minutes prior to washing off (Patient not taking: Reported on 3/29/2021) 120 mL 11     No current facility-administered medications for this visit.       Allergies   Allergen Reactions    Amoxicillin     Strawberry Extract Swelling          Health Maintenance   Topic Date Due    Pneumococcal 0-64 years Vaccine (1 of 1 - PPSV23) Never done    HIV screen  Never done    COVID-19 Vaccine (1) Never done    Cervical cancer screen  Never done    Flu vaccine (1) 06/30/2021 (Originally 9/1/2020)    DTaP/Tdap/Td vaccine (1 - Tdap) 03/29/2022 (Originally 11/11/2012)    Hepatitis A vaccine  Aged Out    Hepatitis B vaccine  Aged Out    Hib vaccine  Aged Out    Meningococcal (ACWY) vaccine  Aged Out    Varicella vaccine  Discontinued    Hepatitis C screen  Discontinued       Subjective:     Review of Systems   Constitutional: Negative for activity change, appetite change, chills, diaphoresis, fatigue, fever and unexpected weight change. Eyes: Negative for visual disturbance. Respiratory: Negative for cough, choking, chest tightness, shortness of breath, wheezing and stridor. Cardiovascular: Positive for palpitations. Negative for chest pain. Gastrointestinal: Negative for abdominal pain, constipation and diarrhea. Genitourinary: Positive for menstrual problem. Negative for pelvic pain. Musculoskeletal: Negative for arthralgias. Skin: Negative for rash. Neurological: Negative for headaches. Psychiatric/Behavioral: Positive for behavioral problems, decreased concentration, dysphoric mood and sleep disturbance. Negative for agitation, confusion, hallucinations, self-injury and suicidal ideas. The patient is nervous/anxious and is hyperactive. Objective:      Physical Exam  Vitals signs and nursing note reviewed. Constitutional:       General: She is not in acute distress. Appearance: Normal appearance. She is well-developed. She is obese. She is not ill-appearing, toxic-appearing or diaphoretic. HENT:      Head: Normocephalic and atraumatic. Right Ear: Tympanic membrane, ear canal and external ear normal.      Left Ear: Tympanic membrane, ear canal and external ear normal.   Neck:      Musculoskeletal: Normal range of motion and neck supple. No neck rigidity. Thyroid: No thyroid mass or thyroid tenderness. Vascular: No carotid bruit.    Cardiovascular:      Rate and Rhythm: Regular rhythm. Tachycardia present. No extrasystoles are present. Pulses: Normal pulses. Heart sounds: Normal heart sounds, S1 normal and S2 normal. Heart sounds not distant. No murmur. Pulmonary:      Effort: Pulmonary effort is normal. No bradypnea, accessory muscle usage, prolonged expiration, respiratory distress or retractions. Breath sounds: Normal breath sounds and air entry. No stridor, decreased air movement or transmitted upper airway sounds. No decreased breath sounds, wheezing, rhonchi or rales. Abdominal:      General: Bowel sounds are normal.      Palpations: Abdomen is soft. There is no hepatomegaly or splenomegaly. Tenderness: There is no abdominal tenderness. Musculoskeletal:      Right shoulder: She exhibits no tenderness, no bony tenderness, no pain, no spasm, normal pulse and normal strength. Left knee: She exhibits normal range of motion, no swelling, no effusion, no ecchymosis, no deformity, no laceration and no erythema. No tenderness found. Lumbar back: She exhibits spasm. She exhibits normal range of motion, no tenderness, no bony tenderness, no swelling, no edema, no deformity, no pain and normal pulse. Right lower leg: No edema. Left lower leg: No edema. Lymphadenopathy:      Cervical: No cervical adenopathy. Skin:     General: Skin is warm and dry. Findings: No rash. Neurological:      General: No focal deficit present. Mental Status: She is alert and oriented to person, place, and time. Cranial Nerves: Cranial nerves are intact. No cranial nerve deficit. Sensory: No sensory deficit. Motor: Motor function is intact. No atrophy or abnormal muscle tone. Coordination: Coordination is intact. Gait: Gait normal.   Psychiatric:         Attention and Perception: She is inattentive. Mood and Affect: Mood is anxious. Speech: Speech is rapid and pressured. Behavior: Behavior is hyperactive. Medications    venlafaxine (EFFEXOR XR) 75 MG extended release capsule     Sig: Take 2 capsules by mouth daily     Dispense:  30 capsule     Refill:  3     Cancel paxil    busPIRone (BUSPAR) 10 MG tablet     Sig: Take 1 tablet by mouth 3 times daily as needed (panic attacks)     Dispense:  90 tablet     Refill:  5    stop paxil start effexor  Did place referral to psych to get in ASAP   Trial buspar 2-3 times daily 10 mg as needed for anxiety /panic attacks  Reviewed when Sandy Boas to take   Continue with counseling /non med options to deal with stressors     Up date screening blood work and related labs for bp an weight gain   May have to monitor if continues high bp will have to stop the adderall      Patientgiven educational materials - see patient instructions. Discussed use, benefit,and side effects of prescribed medications. All patient questions answered. Ptvoiced understanding. Reviewed health maintenance. Instructed to continue currentmedications, diet and exercise. Patient agreed with treatment plan. Follow up asdirected.      Electronically signed by Jazlyn Dennis DO on 3/29/2021 at 12:58 PM

## 2021-04-02 ENCOUNTER — HOSPITAL ENCOUNTER (OUTPATIENT)
Age: 26
Setting detail: SPECIMEN
Discharge: HOME | End: 2021-04-02
Payer: COMMERCIAL

## 2021-04-02 DIAGNOSIS — R63.5 WEIGHT GAIN: ICD-10-CM

## 2021-04-02 DIAGNOSIS — R03.0 ELEVATED BLOOD PRESSURE READING: ICD-10-CM

## 2021-04-02 LAB
ABSOLUTE EOS #: 0.32 K/UL (ref 0–0.44)
ABSOLUTE IMMATURE GRANULOCYTE: 0.03 K/UL (ref 0–0.3)
ABSOLUTE LYMPH #: 2.1 K/UL (ref 1.1–3.7)
ABSOLUTE MONO #: 0.48 K/UL (ref 0.1–1.2)
ALBUMIN SERPL-MCNC: 4.1 G/DL (ref 3.5–5.2)
ALBUMIN/GLOBULIN RATIO: 1.2 (ref 1–2.5)
ALP BLD-CCNC: 55 U/L (ref 35–104)
ALT SERPL-CCNC: 16 U/L (ref 5–33)
ANION GAP SERPL CALCULATED.3IONS-SCNC: 17 MMOL/L (ref 9–17)
AST SERPL-CCNC: 17 U/L
BASOPHILS # BLD: 1 % (ref 0–2)
BASOPHILS ABSOLUTE: 0.06 K/UL (ref 0–0.2)
BILIRUB SERPL-MCNC: 0.22 MG/DL (ref 0.3–1.2)
BUN BLDV-MCNC: 9 MG/DL (ref 6–20)
BUN/CREAT BLD: ABNORMAL (ref 9–20)
CALCIUM SERPL-MCNC: 8.9 MG/DL (ref 8.6–10.4)
CHLORIDE BLD-SCNC: 103 MMOL/L (ref 98–107)
CO2: 18 MMOL/L (ref 20–31)
CORTISOL COLLECTION INFO: ABNORMAL
CORTISOL: 40.6 UG/DL (ref 2.7–18.4)
CREAT SERPL-MCNC: 0.64 MG/DL (ref 0.5–0.9)
DIFFERENTIAL TYPE: ABNORMAL
EOSINOPHILS RELATIVE PERCENT: 5 % (ref 1–4)
GFR AFRICAN AMERICAN: >60 ML/MIN
GFR NON-AFRICAN AMERICAN: >60 ML/MIN
GFR SERPL CREATININE-BSD FRML MDRD: ABNORMAL ML/MIN/{1.73_M2}
GFR SERPL CREATININE-BSD FRML MDRD: ABNORMAL ML/MIN/{1.73_M2}
GLUCOSE BLD-MCNC: 81 MG/DL (ref 70–99)
HCT VFR BLD CALC: 39.6 % (ref 36.3–47.1)
HEMOGLOBIN: 13.1 G/DL (ref 11.9–15.1)
IMMATURE GRANULOCYTES: 1 %
LYMPHOCYTES # BLD: 33 % (ref 24–43)
MCH RBC QN AUTO: 29.2 PG (ref 25.2–33.5)
MCHC RBC AUTO-ENTMCNC: 33.1 G/DL (ref 28.4–34.8)
MCV RBC AUTO: 88.2 FL (ref 82.6–102.9)
MONOCYTES # BLD: 8 % (ref 3–12)
NRBC AUTOMATED: 0 PER 100 WBC
PDW BLD-RTO: 13.1 % (ref 11.8–14.4)
PLATELET # BLD: 399 K/UL (ref 138–453)
PLATELET ESTIMATE: ABNORMAL
PMV BLD AUTO: 10.2 FL (ref 8.1–13.5)
POTASSIUM SERPL-SCNC: 4.1 MMOL/L (ref 3.7–5.3)
RBC # BLD: 4.49 M/UL (ref 3.95–5.11)
RBC # BLD: ABNORMAL 10*6/UL
SEDIMENTATION RATE, ERYTHROCYTE: 16 MM (ref 0–20)
SEG NEUTROPHILS: 52 % (ref 36–65)
SEGMENTED NEUTROPHILS ABSOLUTE COUNT: 3.29 K/UL (ref 1.5–8.1)
SODIUM BLD-SCNC: 138 MMOL/L (ref 135–144)
TOTAL PROTEIN: 7.5 G/DL (ref 6.4–8.3)
TSH SERPL DL<=0.05 MIU/L-ACNC: 1.86 MIU/L (ref 0.3–5)
WBC # BLD: 6.3 K/UL (ref 3.5–11.3)
WBC # BLD: ABNORMAL 10*3/UL

## 2021-04-06 LAB
ESTIMATED AVERAGE GLUCOSE: 97 MG/DL
HBA1C MFR BLD: 5 % (ref 4–6)

## 2021-04-10 LAB — DEXAMETHASONE: <50 NG/DL

## 2021-04-22 DIAGNOSIS — F90.2 ATTENTION DEFICIT HYPERACTIVITY DISORDER (ADHD), COMBINED TYPE: ICD-10-CM

## 2021-04-25 RX ORDER — DEXTROAMPHETAMINE SACCHARATE, AMPHETAMINE ASPARTATE, DEXTROAMPHETAMINE SULFATE AND AMPHETAMINE SULFATE 2.5; 2.5; 2.5; 2.5 MG/1; MG/1; MG/1; MG/1
10 TABLET ORAL 2 TIMES DAILY
Qty: 60 TABLET | Refills: 0 | Status: SHIPPED | OUTPATIENT
Start: 2021-04-25 | End: 2021-06-15

## 2021-06-15 ENCOUNTER — OFFICE VISIT (OUTPATIENT)
Dept: FAMILY MEDICINE CLINIC | Age: 26
End: 2021-06-15
Payer: COMMERCIAL

## 2021-06-15 VITALS
WEIGHT: 209 LBS | SYSTOLIC BLOOD PRESSURE: 130 MMHG | OXYGEN SATURATION: 98 % | HEIGHT: 63 IN | DIASTOLIC BLOOD PRESSURE: 80 MMHG | RESPIRATION RATE: 18 BRPM | BODY MASS INDEX: 37.03 KG/M2 | HEART RATE: 95 BPM

## 2021-06-15 DIAGNOSIS — R51.9 ACUTE INTRACTABLE HEADACHE, UNSPECIFIED HEADACHE TYPE: ICD-10-CM

## 2021-06-15 DIAGNOSIS — F90.9 ATTENTION DEFICIT HYPERACTIVITY DISORDER (ADHD), UNSPECIFIED ADHD TYPE: ICD-10-CM

## 2021-06-15 DIAGNOSIS — E24.9 HYPERCORTISOLISM (HCC): Primary | ICD-10-CM

## 2021-06-15 DIAGNOSIS — F41.9 ANXIETY: ICD-10-CM

## 2021-06-15 PROCEDURE — 99214 OFFICE O/P EST MOD 30 MIN: CPT | Performed by: NURSE PRACTITIONER

## 2021-06-15 RX ORDER — SUMATRIPTAN 50 MG/1
50 TABLET, FILM COATED ORAL
Qty: 9 TABLET | Refills: 1 | Status: SHIPPED | OUTPATIENT
Start: 2021-06-15 | End: 2021-07-27

## 2021-06-15 RX ORDER — ATOMOXETINE 40 MG/1
CAPSULE ORAL
COMMUNITY
Start: 2021-05-23 | End: 2021-07-26 | Stop reason: SDUPTHER

## 2021-06-15 ASSESSMENT — ENCOUNTER SYMPTOMS
SHORTNESS OF BREATH: 0
DIARRHEA: 0
VOMITING: 0
NAUSEA: 0
SORE THROAT: 0
ABDOMINAL PAIN: 0
COUGH: 0
SINUS PAIN: 0

## 2021-06-15 NOTE — PATIENT INSTRUCTIONS

## 2021-06-15 NOTE — PROGRESS NOTES
as needed for Migraine 9 tablet 1    venlafaxine (EFFEXOR XR) 75 MG extended release capsule Take 2 capsules by mouth daily 30 capsule 0    drospirenone-ethinyl estradiol (MEDINA 28) 3-0.03 MG TABS Take 1 tablet by mouth daily 1 packet 12    naproxen (NAPROSYN) 500 MG tablet Take 1 tablet by mouth 2 times daily as needed for Pain 60 tablet 5    benzoyl peroxide 5 % external liquid Wash face, chest and back and groin 1-2 times daily (Patient not taking: Reported on 6/15/2021) 227 g 11    tretinoin (RETIN-A) 0.025 % cream Apply pea sized amount to face, chest and back nightly (Patient not taking: Reported on 6/15/2021) 45 g 11    ketoconazole (NIZORAL) 2 % shampoo Apply 3-4 times weekly to scalp, leave on for five minutes prior to washing off (Patient not taking: Reported on 3/29/2021) 120 mL 11     No current facility-administered medications for this visit. Allergies   Allergen Reactions    Amoxicillin     Strawberry Extract Swelling       Subjective:      Review of Systems   Constitutional: Negative for chills and fever. HENT: Negative for ear pain, sinus pain and sore throat. Respiratory: Negative for cough and shortness of breath. Cardiovascular: Negative for chest pain and palpitations. Gastrointestinal: Negative for abdominal pain, diarrhea, nausea and vomiting. Neurological: Negative for dizziness and headaches. All other systems reviewed and are negative.      :Objective     Physical Exam  Vitals and nursing note reviewed. Constitutional:       General: She is not in acute distress. Appearance: Normal appearance. She is not toxic-appearing. HENT:      Mouth/Throat:      Mouth: Mucous membranes are moist.      Pharynx: Oropharynx is clear. Eyes:      Extraocular Movements: Extraocular movements intact. Pupils: Pupils are equal, round, and reactive to light. Cardiovascular:      Rate and Rhythm: Normal rate.    Pulmonary:      Effort: Pulmonary effort is normal. No respiratory distress. Breath sounds: Normal breath sounds. Skin:     General: Skin is warm and dry. Neurological:      General: No focal deficit present. Mental Status: She is alert and oriented to person, place, and time. /80   Pulse 95   Resp 18   Ht 5' 3\" (1.6 m)   Wt 209 lb (94.8 kg)   SpO2 98%   BMI 37.02 kg/m²     Lab Review   Hospital Outpatient Visit on 04/02/2021   Component Date Value    Dexamethasone 04/02/2021 <50.0     Cortisol 04/02/2021 40.6*    Cortisol Collection Info 04/02/2021 A. M.     Sed Rate 04/02/2021 16     Hemoglobin A1C 04/02/2021 5.0     Estimated Avg Glucose 04/02/2021 97     TSH 04/02/2021 1.86     Glucose 04/02/2021 81     BUN 04/02/2021 9     CREATININE 04/02/2021 0.64     Bun/Cre Ratio 04/02/2021 NOT REPORTED     Calcium 04/02/2021 8.9     Sodium 04/02/2021 138     Potassium 04/02/2021 4.1     Chloride 04/02/2021 103     CO2 04/02/2021 18*    Anion Gap 04/02/2021 17     Alkaline Phosphatase 04/02/2021 55     ALT 04/02/2021 16     AST 04/02/2021 17     Total Bilirubin 04/02/2021 0.22*    Total Protein 04/02/2021 7.5     Albumin 04/02/2021 4.1     Albumin/Globulin Ratio 04/02/2021 1.2     GFR Non- 04/02/2021 >60     GFR  04/02/2021 >60     GFR Comment 04/02/2021          GFR Staging 04/02/2021 NOT REPORTED     WBC 04/02/2021 6.3     RBC 04/02/2021 4.49     Hemoglobin 04/02/2021 13.1     Hematocrit 04/02/2021 39.6     MCV 04/02/2021 88.2     MCH 04/02/2021 29.2     MCHC 04/02/2021 33.1     RDW 04/02/2021 13.1     Platelets 80/96/0926 399     MPV 04/02/2021 10.2     NRBC Automated 04/02/2021 0.0     Differential Type 04/02/2021 NOT REPORTED     Seg Neutrophils 04/02/2021 52     Lymphocytes 04/02/2021 33     Monocytes 04/02/2021 8     Eosinophils % 04/02/2021 5*    Basophils 04/02/2021 1     Immature Granulocytes 04/02/2021 1*    Segs Absolute 04/02/2021 3.29     Absolute Lymph # 04/02/2021 2.10     Absolute Mono # 04/02/2021 0.48     Absolute Eos # 04/02/2021 0.32     Basophils Absolute 04/02/2021 0.06     Absolute Immature Granul* 04/02/2021 0.03     WBC Morphology 04/02/2021 NOT REPORTED     RBC Morphology 04/02/2021 NOT REPORTED     Platelet Estimate 50/93/0689 NOT REPORTED        Assessment and Plan      1. Hypercortisolism ([address]  Assessment & Plan:   Rechecking cortisol urine 24    Orders:  -     Cortisol, Urine, Free; Future  2. Acute intractable headache, unspecified headache type  Assessment & Plan:  Imitrex trial  D/c adderall    Orders:  -     SUMAtriptan (IMITREX) 50 MG tablet; Take 1 tablet by mouth once as needed for Migraine, Disp-9 tablet, R-1Normal  3. Anxiety  -     External Referral To Psychiatry  4. Attention deficit hyperactivity disorder (ADHD), unspecified ADHD type  Assessment & Plan:   Continue straterra  D/c adderall d/t side effects      Recheck headaches in 6 weeks                 No results found for this visit on 06/15/21. Return in about 6 weeks (around 7/27/2021), or if symptoms worsen or fail to improve. Orders Placed This Encounter   Medications    SUMAtriptan (IMITREX) 50 MG tablet     Sig: Take 1 tablet by mouth once as needed for Migraine     Dispense:  9 tablet     Refill:  1        Patient given educational materials - see patient instructions. Discussed use, benefit, and side effects of prescribed medications. All patientquestions answered. Pt voiced understanding. Patient given educational materials - see patient instructions. Discussed use, benefit, and side effects of prescribed medications. All patientquestions answered. Pt voiced understanding. This note was transcribed using dictation with Dragon services. Efforts were made to correct any errors but some words may be misinterpreted.     Electronically signed by JOSE Stauffer CNP on 6/15/2021at 12:33 PM

## 2021-06-21 RX ORDER — VENLAFAXINE HYDROCHLORIDE 75 MG/1
150 CAPSULE, EXTENDED RELEASE ORAL DAILY
Qty: 30 CAPSULE | Refills: 0 | Status: SHIPPED | OUTPATIENT
Start: 2021-06-21 | End: 2021-07-05 | Stop reason: SDUPTHER

## 2021-06-22 ENCOUNTER — HOSPITAL ENCOUNTER (OUTPATIENT)
Age: 26
Setting detail: SPECIMEN
Discharge: HOME | End: 2021-06-22
Payer: COMMERCIAL

## 2021-06-22 DIAGNOSIS — E24.9 HYPERCORTISOLISM (HCC): ICD-10-CM

## 2021-06-23 ENCOUNTER — TELEPHONE (OUTPATIENT)
Dept: FAMILY MEDICINE CLINIC | Age: 26
End: 2021-06-23

## 2021-06-23 ENCOUNTER — OFFICE VISIT (OUTPATIENT)
Dept: PRIMARY CARE CLINIC | Age: 26
End: 2021-06-23
Payer: COMMERCIAL

## 2021-06-23 VITALS
BODY MASS INDEX: 37.03 KG/M2 | WEIGHT: 209 LBS | HEIGHT: 63 IN | SYSTOLIC BLOOD PRESSURE: 105 MMHG | OXYGEN SATURATION: 100 % | DIASTOLIC BLOOD PRESSURE: 74 MMHG | HEART RATE: 84 BPM

## 2021-06-23 DIAGNOSIS — G43.101 MIGRAINE WITH AURA AND WITH STATUS MIGRAINOSUS, NOT INTRACTABLE: ICD-10-CM

## 2021-06-23 DIAGNOSIS — J01.10 ACUTE NON-RECURRENT FRONTAL SINUSITIS: Primary | ICD-10-CM

## 2021-06-23 PROCEDURE — 99213 OFFICE O/P EST LOW 20 MIN: CPT | Performed by: NURSE PRACTITIONER

## 2021-06-23 RX ORDER — ONDANSETRON 4 MG/1
4 TABLET, ORALLY DISINTEGRATING ORAL EVERY 6 HOURS PRN
Qty: 15 TABLET | Refills: 0 | Status: SHIPPED | OUTPATIENT
Start: 2021-06-23 | End: 2021-07-27 | Stop reason: SDUPTHER

## 2021-06-23 RX ORDER — AZITHROMYCIN 250 MG/1
250 TABLET, FILM COATED ORAL SEE ADMIN INSTRUCTIONS
Qty: 6 TABLET | Refills: 0 | Status: SHIPPED | OUTPATIENT
Start: 2021-06-23 | End: 2021-06-28

## 2021-06-23 RX ORDER — FLUTICASONE PROPIONATE 50 MCG
2 SPRAY, SUSPENSION (ML) NASAL DAILY
Qty: 1 BOTTLE | Refills: 0 | Status: SHIPPED | OUTPATIENT
Start: 2021-06-23

## 2021-06-23 RX ORDER — KETOROLAC TROMETHAMINE 30 MG/ML
60 INJECTION, SOLUTION INTRAMUSCULAR; INTRAVENOUS ONCE
Status: COMPLETED | OUTPATIENT
Start: 2021-06-23 | End: 2021-06-23

## 2021-06-23 RX ADMIN — KETOROLAC TROMETHAMINE 60 MG: 30 INJECTION, SOLUTION INTRAMUSCULAR; INTRAVENOUS at 13:29

## 2021-06-23 SDOH — ECONOMIC STABILITY: FOOD INSECURITY: WITHIN THE PAST 12 MONTHS, YOU WORRIED THAT YOUR FOOD WOULD RUN OUT BEFORE YOU GOT MONEY TO BUY MORE.: NEVER TRUE

## 2021-06-23 SDOH — ECONOMIC STABILITY: FOOD INSECURITY: WITHIN THE PAST 12 MONTHS, THE FOOD YOU BOUGHT JUST DIDN'T LAST AND YOU DIDN'T HAVE MONEY TO GET MORE.: NEVER TRUE

## 2021-06-23 ASSESSMENT — ENCOUNTER SYMPTOMS
VOMITING: 1
SORE THROAT: 0
SINUS PRESSURE: 1
PHOTOPHOBIA: 1
NAUSEA: 1

## 2021-06-23 ASSESSMENT — SOCIAL DETERMINANTS OF HEALTH (SDOH): HOW HARD IS IT FOR YOU TO PAY FOR THE VERY BASICS LIKE FOOD, HOUSING, MEDICAL CARE, AND HEATING?: NOT HARD AT ALL

## 2021-06-23 NOTE — LETTER
[facility]  [address]  [address]  Phone: [phone]  Fax: [phone]    Katina Record, APRN - CNP        June 23, 2021     Patient: Chilo Cid   YOB: 1995   Date of Visit: 6/23/2021       To Whom It May Concern: It is my medical opinion that Joe Walters may return to work on 6/26/2021. If you have any questions or concerns, please don't hesitate to call.     Sincerely,        Katina Barker, APRN - CNP

## 2021-06-23 NOTE — PATIENT INSTRUCTIONS
Take benadryl over the counter - take per package instructions when you get home  Push fluids, keep hydrated  Get plenty rest    Patient Education        Migraine Headache: Care Instructions  Overview     Migraines are painful, throbbing headaches that often start on one side of the head. They may cause nausea and vomiting and make you sensitive to light, sound, or smell. Without treatment, migraines can last from 4 hours to a few days. Medicines can help prevent migraines or stop them after they have started. Your doctor can help you find which ones work best for you. Follow-up care is a key part of your treatment and safety. Be sure to make and go to all appointments, and call your doctor if you are having problems. It's also a good idea to know your test results and keep a list of the medicines you take. How can you care for yourself at home? · Do not drive if you have taken a prescription pain medicine. · Rest in a quiet, dark room until your headache is gone. Close your eyes, and try to relax or go to sleep. Don't watch TV or read. · Put a cold, moist cloth or cold pack on the painful area for 10 to 20 minutes at a time. Put a thin cloth between the cold pack and your skin. · Use a warm, moist towel or a heating pad set on low to relax tight shoulder and neck muscles. · Have someone gently massage your neck and shoulders. · Take your medicines exactly as prescribed. Call your doctor if you think you are having a problem with your medicine. You will get more details on the specific medicines your doctor prescribes. · Don't take medicine for headache pain too often. Talk to your doctor if you are taking medicine more than 2 days a week to stop a headache. Taking too much pain medicine can lead to more headaches. These are called medicine-overuse headaches. To prevent migraines  · Keep a headache diary so you can figure out what triggers your headaches. Avoiding triggers may help you prevent headaches. Record when each headache began, how long it lasted, and what the pain was like. Write down any other symptoms you had with the headache, such as nausea, flashing lights or dark spots, or sensitivity to bright light or loud noise. Note if the headache occurred near your period. List anything that might have triggered the headache. Triggers may include certain foods (chocolate, cheese, wine) or odors, smoke, bright light, stress, or lack of sleep. · If your doctor has prescribed medicine for your migraines, take it as directed. You may have medicine that you take only when you get a migraine and medicine that you take all the time to help prevent migraines. ? If your doctor has prescribed medicine for when you get a headache, take it at the first sign of a migraine, unless your doctor has given you other instructions. ? If your doctor has prescribed medicine to prevent migraines, take it exactly as prescribed. Call your doctor if you think you are having a problem with your medicine. · Find healthy ways to deal with stress. Migraines are most common during or right after stressful times. Try finding ways to reduce stress like practicing mindfulness or deep breathing exercises. · Get plenty of sleep and exercise. But be careful to not push yourself too hard during exercise. It may trigger a headache. · Eat meals on a regular schedule. Avoid foods and drinks that often trigger migraines. These include chocolate, alcohol (especially red wine and port), aspartame, monosodium glutamate (MSG), and some additives found in foods (such as hot dogs, la, cold cuts, aged cheeses, and pickled foods). · Limit caffeine. Don't drink too much coffee, tea, or soda. But don't quit caffeine suddenly. That can also give you migraines. · Do not smoke or allow others to smoke around you. If you need help quitting, talk to your doctor about stop-smoking programs and medicines. These can increase your chances of quitting for good. · If you are taking birth control pills or hormone therapy, talk to your doctor about whether they are triggering your migraines. When should you call for help? Call 911 anytime you think you may need emergency care. For example, call if:    · You have signs of a stroke. These may include:  ? Sudden numbness, paralysis, or weakness in your face, arm, or leg, especially on only one side of your body. ? Sudden vision changes. ? Sudden trouble speaking. ? Sudden confusion or trouble understanding simple statements. ? Sudden problems with walking or balance. ? A sudden, severe headache that is different from past headaches. Call your doctor now or seek immediate medical care if:    · You have new or worse nausea and vomiting.     · You have a new or higher fever.     · Your headache gets much worse. Watch closely for changes in your health, and be sure to contact your doctor if:    · You are not getting better after 2 days (48 hours). Where can you learn more? Go to https://Restaurant.com.ReefEdge. org and sign in to your [employer] account. Enter G845 in the [employer] box to learn more about \"Migraine Headache: Care Instructions. \"     If you do not have an account, please click on the \"Sign Up Now\" link. Current as of: August 4, 2020               Content Version: 12.9  © 2006-2021 [employer]. Care instructions adapted under license by [facility] ([facility]). If you have questions about a medical condition or this instruction, always ask your healthcare professional. [place] [address] any warranty or liability for your use of this information. Patient Education        Sinusitis: Care Instructions  Your Care Instructions     Sinusitis is an infection of the lining of the sinus cavities in your head. Sinusitis often follows a cold. It causes pain and pressure in your head and face. In most cases, sinusitis gets better on its own in 1 to 2 weeks.  But some mild symptoms may last for several weeks. Sometimes antibiotics are needed. Follow-up care is a key part of your treatment and safety. Be sure to make and go to all appointments, and call your doctor if you are having problems. It's also a good idea to know your test results and keep a list of the medicines you take. How can you care for yourself at home? · Take an over-the-counter pain medicine, such as acetaminophen (Tylenol), ibuprofen (Advil, Motrin), or naproxen (Aleve). Read and follow all instructions on the label. · If the doctor prescribed antibiotics, take them as directed. Do not stop taking them just because you feel better. You need to take the full course of antibiotics. · Be careful when taking over-the-counter cold or flu medicines and Tylenol at the same time. Many of these medicines have acetaminophen, which is Tylenol. Read the labels to make sure that you are not taking more than the recommended dose. Too much acetaminophen (Tylenol) can be harmful. · Breathe warm, moist air from a steamy shower, a hot bath, or a sink filled with hot water. Avoid cold, dry air. Using a humidifier in your home may help. Follow the directions for cleaning the machine. · Use saline (saltwater) nasal washes. This can help keep your nasal passages open and wash out mucus and bacteria. You can buy saline nose drops at a grocery store or drugstore. Or you can make your own at home by adding 1 teaspoon of salt and 1 teaspoon of baking soda to 2 cups of distilled water. If you make your own, fill a bulb syringe with the solution, insert the tip into your nostril, and squeeze gently. Steven Furl your nose. · Put a hot, wet towel or a warm gel pack on your face 3 or 4 times a day for 5 to 10 minutes each time. · Try a decongestant nasal spray like oxymetazoline (Afrin). Do not use it for more than 3 days in a row. Using it for more than 3 days can make your congestion worse. When should you call for help?    Call your doctor now or seek immediate medical care if:    · You have new or worse swelling or redness in your face or around your eyes.     · You have a new or higher fever. Watch closely for changes in your health, and be sure to contact your doctor if:    · You have new or worse facial pain.     · The mucus from your nose becomes thicker (like pus) or has new blood in it.     · You are not getting better as expected. Where can you learn more? Go to https://DxO LabspeJambotech.Ar[employer] Group. org and sign in to your [employer] app account. Enter G806 in the Presentigo box to learn more about \"Sinusitis: Care Instructions. \"     If you do not have an account, please click on the \"Sign Up Now\" link. Current as of: December 2, 2020               Content Version: 12.9  © 7566-1114 [employer]. Care instructions adapted under license by [facility] ([facility]). If you have questions about a medical condition or this instruction, always ask your healthcare professional. [place] [address] any warranty or liability for your use of this information.

## 2021-06-23 NOTE — PROGRESS NOTES
[facility]  [address] [address]  Dept: [phone]  Dept Fax: [phone]    Leona Ricardo is a 30 y.o. female who presents to the urgent care today for her medical conditions/complaints as notedbelow. Leona Ricardo is c/o of Migraine (x2 weeks- N&V, left work today )      HPI:     30 yr old female with hx migraines for 2 weeks, gets betetr and worse. Vomited from the pain twice in last 4 days. Tried Ibuprofen and tylenol. Imitrex makes her nauseated. No relief. 2017 mri head neg acuity - [place]ist there. Typical HA, chars and location for her. Does have some sinus sx that may be causing HA too. No fevers. Had to leave work early, needs note. Migraine   This is a recurrent problem. The current episode started 1 to 4 weeks ago (x2 weeks). The problem occurs intermittently. The problem has been waxing and waning. The pain is located in the frontal region. The pain does not radiate. The pain quality is similar to prior headaches. The quality of the pain is described as aching and band-like. The pain is moderate. Associated symptoms include anorexia, nausea, phonophobia, photophobia, sinus pressure and vomiting. Pertinent negatives include no fever or sore throat. The symptoms are aggravated by bright light and noise. She has tried NSAIDs for the symptoms. The treatment provided no relief. Her past medical history is significant for migraine headaches.        Past Medical History:   Diagnosis Date    Acne vulgaris     ADHD     Congenital benign skin mole     left hand    Depression 10/2020    Suicidal Ideation    Hyperlipidemia     Hypopigmented skin lesion     left hip    Seborrheic dermatitis     Candy-Parkinson-White (WPW) pattern     Had ablation 09/2012        Current Outpatient Medications   Medication Sig Dispense Refill    ondansetron (ZOFRAN ODT) 4 MG disintegrating tablet Take 1 tablet by mouth every 6 hours as needed for Nausea or Vomiting 15 tablet 0    fluticasone (FLONASE) 50 MCG/ACT nasal spray 2 sprays by Nasal route daily 1 Bottle 0    azithromycin (ZITHROMAX) 250 MG tablet Take 1 tablet by mouth See Admin Instructions for 5 days 500mg on day 1 followed by 250mg on days 2 - 5 6 tablet 0    venlafaxine (EFFEXOR XR) 75 MG extended release capsule Take 2 capsules by mouth daily 30 capsule 0    atomoxetine (STRATTERA) 40 MG capsule TAKE 1 CAPSULE BY MOUTH EVERY DAY      SUMAtriptan (IMITREX) 50 MG tablet Take 1 tablet by mouth once as needed for Migraine 9 tablet 1    drospirenone-ethinyl estradiol (MEDINA 28) 3-0.03 MG TABS Take 1 tablet by mouth daily 1 packet 12    benzoyl peroxide 5 % external liquid Wash face, chest and back and groin 1-2 times daily 227 g 11    tretinoin (RETIN-A) 0.025 % cream Apply pea sized amount to face, chest and back nightly 45 g 11    ketoconazole (NIZORAL) 2 % shampoo Apply 3-4 times weekly to scalp, leave on for five minutes prior to washing off 120 mL 11    naproxen (NAPROSYN) 500 MG tablet Take 1 tablet by mouth 2 times daily as needed for Pain 60 tablet 5     No current facility-administered medications for this visit. Allergies   Allergen Reactions    Amoxicillin     Strawberry Extract Swelling       Subjective:      Review of Systems   Constitutional: Negative for fever. HENT: Positive for sinus pressure. Negative for sore throat. Eyes: Positive for photophobia. Gastrointestinal: Positive for anorexia, nausea and vomiting. All other systems reviewed and are negative. 14 systems reviewed and negative except as listed in HPI. Objective:     Physical Exam  Vitals and nursing note reviewed. Constitutional:       General: She is not in acute distress. Appearance: Normal appearance. She is not ill-appearing, toxic-appearing or diaphoretic. HENT:      Head: Normocephalic.       Right Ear: Tympanic membrane, ear canal

## 2021-06-25 LAB
CORTISOL (UR), FREE: 7.4 UG/D
CORTISOL URINE, FREE (/G CRT): 14 UG/L
CORTISOL, URINE RATIO CREATININE: 15.73 UG/G CRT
CORTISOL, URINE: ABNORMAL
CREATININE URINE /24 HR: 467 MG/D (ref 700–1600)
CREATININE URINE /VOLUME: 89 MG/DL
HOURS COLLECTED: 24
URINE VOLUME: 525

## 2021-07-05 RX ORDER — VENLAFAXINE HYDROCHLORIDE 75 MG/1
150 CAPSULE, EXTENDED RELEASE ORAL DAILY
Qty: 30 CAPSULE | Refills: 0 | Status: SHIPPED | OUTPATIENT
Start: 2021-07-05 | End: 2021-07-06

## 2021-07-23 RX ORDER — VENLAFAXINE HYDROCHLORIDE 75 MG/1
150 CAPSULE, EXTENDED RELEASE ORAL DAILY
Qty: 30 CAPSULE | Refills: 5 | Status: SHIPPED | OUTPATIENT
Start: 2021-07-23

## 2021-07-26 RX ORDER — ATOMOXETINE 40 MG/1
CAPSULE ORAL
Qty: 30 CAPSULE | Refills: 0 | Status: SHIPPED | OUTPATIENT
Start: 2021-07-26 | End: 2021-08-23

## 2021-07-27 ENCOUNTER — OFFICE VISIT (OUTPATIENT)
Dept: FAMILY MEDICINE CLINIC | Age: 26
End: 2021-07-27
Payer: COMMERCIAL

## 2021-07-27 VITALS
HEIGHT: 63 IN | HEART RATE: 103 BPM | OXYGEN SATURATION: 98 % | BODY MASS INDEX: 37.56 KG/M2 | SYSTOLIC BLOOD PRESSURE: 106 MMHG | DIASTOLIC BLOOD PRESSURE: 68 MMHG | WEIGHT: 212 LBS | RESPIRATION RATE: 18 BRPM

## 2021-07-27 DIAGNOSIS — M54.50 CHRONIC MIDLINE LOW BACK PAIN WITHOUT SCIATICA: ICD-10-CM

## 2021-07-27 DIAGNOSIS — G89.29 CHRONIC MIDLINE LOW BACK PAIN WITHOUT SCIATICA: ICD-10-CM

## 2021-07-27 DIAGNOSIS — Z00.00 HEALTHCARE MAINTENANCE: ICD-10-CM

## 2021-07-27 DIAGNOSIS — M25.562 LEFT KNEE PAIN, UNSPECIFIED CHRONICITY: ICD-10-CM

## 2021-07-27 DIAGNOSIS — R51.9 ACUTE INTRACTABLE HEADACHE, UNSPECIFIED HEADACHE TYPE: Primary | ICD-10-CM

## 2021-07-27 PROCEDURE — 99213 OFFICE O/P EST LOW 20 MIN: CPT | Performed by: NURSE PRACTITIONER

## 2021-07-27 RX ORDER — CYCLOBENZAPRINE HCL 10 MG
10 TABLET ORAL NIGHTLY PRN
Qty: 30 TABLET | Refills: 0 | Status: SHIPPED | OUTPATIENT
Start: 2021-07-27 | End: 2022-05-18

## 2021-07-27 RX ORDER — ONDANSETRON 4 MG/1
4 TABLET, ORALLY DISINTEGRATING ORAL EVERY 6 HOURS PRN
Qty: 15 TABLET | Refills: 0 | Status: SHIPPED | OUTPATIENT
Start: 2021-07-27

## 2021-07-27 ASSESSMENT — ENCOUNTER SYMPTOMS
DIARRHEA: 0
COUGH: 0
NAUSEA: 0
SINUS PAIN: 0
ABDOMINAL PAIN: 0
VOMITING: 0
SORE THROAT: 0
BACK PAIN: 1
SHORTNESS OF BREATH: 0

## 2021-07-27 NOTE — PROGRESS NOTES
Visit Information    Have you changed or started any medications since your last visit including any over-the-counter medicines, vitamins, or herbal medicines? no   Are you having any side effects from any of your medications? -  no  Have you stopped taking any of your medications? Is so, why? -  no    Have you seen any other physician or provider since your last visit? No  Have you had any other diagnostic tests since your last visit? No  Have you been seen in the emergency room and/or had an admission to a hospital since we last saw you? No  Have you had your routine dental cleaning in the past 6 months? no    Have you activated your [employer] account? If not, what are your barriers?  Yes     Patient Care Team:  JOSE Michaels CNP as PCP - General (Certified Nurse Practitioner)  JOSE Michaels CNP as PCP - [facility] Provider    Medical History Review  Past Medical, Family, and Social History reviewed and does contribute to the patient presenting condition    Health Maintenance   Topic Date Due    Pneumococcal 0-64 years Vaccine (1 of 2 - PPSV23) Never done    COVID-19 Vaccine (1) Never done    HIV screen  Never done    Cervical cancer screen  Never done    DTaP/Tdap/Td vaccine (1 - Tdap) 03/29/2022 (Originally 11/11/2012)    Flu vaccine (1) 09/01/2021    Hepatitis A vaccine  Aged Out    Hepatitis B vaccine  Aged Out    Hib vaccine  Aged Out    Meningococcal (ACWY) vaccine  Aged Out    Varicella vaccine  Discontinued    Hepatitis C screen  Discontinued

## 2021-07-27 NOTE — PROGRESS NOTES
[facility]  [address] [facility]  [address] [place] [id number]  Dept: [phone]  Dept Fax: [phone]    Kristi Collier is a 30 y.o. female who presents today for her medicalconditions/complaints as noted below. Kristi Collier is c/o of Follow-up (has not called to find a psych dr. states she will call today or tomorrow) and Other (want to know what covid vaccine to get)      HPI:         49-year-old female patient presents with complaints of follow-up. Patient's headaches improved after trial of Imitrex. Reports that the Imitrex only helped briefly and made her significantly nauseated. Did have recent sinus infection treated with antibiotics has seen improvement in her headaches since that time. Additionally patient has concerns for lower back and left knee pain. Reports the left knee has been aching constantly worse after she stands at work. Reports the lower back is described as mild midline. Denies loss of bladder bowel control. Denies saddle anesthesia. Denies sciatica. Patient will be following up with psychiatry regarding her mental health    Request referral to OB.       Past Medical History:   Diagnosis Date    Acne vulgaris     ADHD     Congenital benign skin mole     left hand    Depression 10/2020    Suicidal Ideation    Hyperlipidemia     Hypopigmented skin lesion     left hip    Seborrheic dermatitis     Candy-Parkinson-White (WPW) pattern     Had ablation 09/2012        Current Outpatient Medications   Medication Sig Dispense Refill    cyclobenzaprine (FLEXERIL) 10 MG tablet Take 1 tablet by mouth nightly as needed for Muscle spasms 30 tablet 0    ondansetron (ZOFRAN ODT) 4 MG disintegrating tablet Take 1 tablet by mouth every 6 hours as needed for Nausea or Vomiting 15 tablet 0    atomoxetine (STRATTERA) 40 MG capsule TAKE 1 CAPSULE BY MOUTH EVERY DAY 30 capsule 0    venlafaxine (EFFEXOR XR) 75 MG extended release capsule Take 2 capsules by mouth daily 30 capsule 5    fluticasone (FLONASE) 50 MCG/ACT nasal spray 2 sprays by Nasal route daily 1 Bottle 0    drospirenone-ethinyl estradiol (MEDINA 28) 3-0.03 MG TABS Take 1 tablet by mouth daily 1 packet 12    naproxen (NAPROSYN) 500 MG tablet Take 1 tablet by mouth 2 times daily as needed for Pain 60 tablet 5     No current facility-administered medications for this visit. Allergies   Allergen Reactions    Amoxicillin     Strawberry Extract Swelling       Subjective:      Review of Systems   Constitutional: Negative for chills and fever. HENT: Negative for ear pain, sinus pain and sore throat. Respiratory: Negative for cough and shortness of breath. Cardiovascular: Negative for chest pain and palpitations. Gastrointestinal: Negative for abdominal pain, diarrhea, nausea and vomiting. Musculoskeletal: Positive for arthralgias (left knee) and back pain. Neurological: Positive for headaches. Negative for dizziness. All other systems reviewed and are negative.      :Objective     Physical Exam  Vitals and nursing note reviewed. Constitutional:       General: She is not in acute distress. Appearance: Normal appearance. She is not toxic-appearing. HENT:      Mouth/Throat:      Mouth: Mucous membranes are moist.   Cardiovascular:      Rate and Rhythm: Normal rate. Pulmonary:      Effort: Pulmonary effort is normal. No respiratory distress. Breath sounds: Normal breath sounds. Musculoskeletal:      Lumbar back: Spasms and tenderness present. Left knee: Bony tenderness present. Skin:     General: Skin is warm and dry. Neurological:      General: No focal deficit present. Mental Status: She is alert and oriented to person, place, and time.        /68   Pulse 103   Resp 18   Ht 5' 3\" (1.6 m)   Wt 212 lb (96.2 kg)   SpO2 98%   BMI 37.55 kg/m²     Lab Review   Hospital Outpatient Visit on 06/22/2021   Component Date Value    Hours Collected 06/22/2021 24     Urine Volume 06/22/2021 525     Cortisol, Urine ratio Cr* 06/22/2021 15.73     Cortisol,Ur Free 06/22/2021 14.00     Cortisol (Ur), Free 06/22/2021 7.4     Cortisol, Urine 06/22/2021 See Note     Creatinine Urine /volume 06/22/2021 89     Creatinine Urine /24 Hr 06/22/2021 Dannie Dub [facility] Outpatient Visit on 04/02/2021   Component Date Value    Dexamethasone 04/02/2021 <50.0     Cortisol 04/02/2021 40.6*    Cortisol Collection Info 04/02/2021 A. M.     Sed Rate 04/02/2021 16     Hemoglobin A1C 04/02/2021 5.0     Estimated Avg Glucose 04/02/2021 97     TSH 04/02/2021 1.86     Glucose 04/02/2021 81     BUN 04/02/2021 9     CREATININE 04/02/2021 0.64     Bun/Cre Ratio 04/02/2021 NOT REPORTED     Calcium 04/02/2021 8.9     Sodium 04/02/2021 138     Potassium 04/02/2021 4.1     Chloride 04/02/2021 103     CO2 04/02/2021 18*    Anion Gap 04/02/2021 17     Alkaline Phosphatase 04/02/2021 55     ALT 04/02/2021 16     AST 04/02/2021 17     Total Bilirubin 04/02/2021 0.22*    Total Protein 04/02/2021 7.5     Albumin 04/02/2021 4.1     Albumin/Globulin Ratio 04/02/2021 1.2     GFR Non- 04/02/2021 >60     GFR  04/02/2021 >60     GFR Comment 04/02/2021          GFR Staging 04/02/2021 NOT REPORTED     WBC 04/02/2021 6.3     RBC 04/02/2021 4.49     Hemoglobin 04/02/2021 13.1     Hematocrit 04/02/2021 39.6     MCV 04/02/2021 88.2     MCH 04/02/2021 29.2     MCHC 04/02/2021 33.1     RDW 04/02/2021 13.1     Platelets 22/22/2620 399     MPV 04/02/2021 10.2     NRBC Automated 04/02/2021 0.0     Differential Type 04/02/2021 NOT REPORTED     Seg Neutrophils 04/02/2021 52     Lymphocytes 04/02/2021 33     Monocytes 04/02/2021 8     Eosinophils % 04/02/2021 5*    Basophils 04/02/2021 1     Immature Granulocytes 04/02/2021 1*    Segs Absolute 04/02/2021 3.29     Absolute Lymph # 04/02/2021 2.10     Absolute Mono # 04/02/2021 0.48     Absolute Eos # 04/02/2021 0.32     Basophils Absolute 04/02/2021 0.06     Absolute Immature Granul* 04/02/2021 0.03     WBC Morphology 04/02/2021 NOT REPORTED     RBC Morphology 04/02/2021 NOT REPORTED     Platelet Estimate 65/62/4793 NOT REPORTED        Assessment and Plan      1. Acute intractable headache, unspecified headache type  -     cyclobenzaprine (FLEXERIL) 10 MG tablet; Take 1 tablet by mouth nightly as needed for Muscle spasms, Disp-30 tablet, R-0Normal  -     ondansetron (ZOFRAN ODT) 4 MG disintegrating tablet; Take 1 tablet by mouth every 6 hours as needed for Nausea or Vomiting, Disp-15 tablet, R-0Normal  2. Chronic midline low back pain without sciatica  -     cyclobenzaprine (FLEXERIL) 10 MG tablet; Take 1 tablet by mouth nightly as needed for Muscle spasms, Disp-30 tablet, R-0Normal  -     ondansetron (ZOFRAN ODT) 4 MG disintegrating tablet; Take 1 tablet by mouth every 6 hours as needed for Nausea or Vomiting, Disp-15 tablet, R-0Normal  3. Mariann, [address], DO, OB/GYN, [facility]  4. Left knee pain, unspecified chronicity  -     XR KNEE LEFT (3 VIEWS); Future          Discontinue Imitrex will trial Flexeril regarding chronic lower back pain, headaches, Zofran as needed    X-ray of left knee    OB referral placed    Follow-up in 2 months sooner. No results found for this visit on 07/27/21. Return if symptoms worsen or fail to improve. Orders Placed This Encounter   Medications    cyclobenzaprine (FLEXERIL) 10 MG tablet     Sig: Take 1 tablet by mouth nightly as needed for Muscle spasms     Dispense:  30 tablet     Refill:  0    ondansetron (ZOFRAN ODT) 4 MG disintegrating tablet     Sig: Take 1 tablet by mouth every 6 hours as needed for Nausea or Vomiting     Dispense:  15 tablet     Refill:  0        Patient given educational materials - see patient instructions. Discussed use, benefit, and side effects of prescribed medications. All patientquestions answered. Pt voiced understanding. Patient given educational materials - see patient instructions. Discussed use, benefit, and side effects of prescribed medications. All patientquestions answered. Pt voiced understanding. This note was transcribed using dictation with Dragon services. Efforts were made to correct any errors but some words may be misinterpreted.     Electronically signed by JOSE Vasquez CNP on 7/27/2021at 1:53 PM

## 2021-07-27 NOTE — PATIENT INSTRUCTIONS

## 2021-08-23 RX ORDER — ATOMOXETINE 40 MG/1
CAPSULE ORAL
Qty: 30 CAPSULE | Refills: 0 | Status: SHIPPED | OUTPATIENT
Start: 2021-08-23 | End: 2021-09-27 | Stop reason: SDUPTHER

## 2021-08-24 RX ORDER — DROSPIRENONE AND ETHINYL ESTRADIOL 0.03MG-3MG
KIT ORAL
Qty: 28 TABLET | Refills: 0 | Status: SHIPPED | OUTPATIENT
Start: 2021-08-24 | End: 2021-09-09 | Stop reason: SDUPTHER

## 2021-08-31 ENCOUNTER — OFFICE VISIT (OUTPATIENT)
Dept: PRIMARY CARE CLINIC | Age: 26
End: 2021-08-31
Payer: COMMERCIAL

## 2021-08-31 ENCOUNTER — HOSPITAL ENCOUNTER (OUTPATIENT)
Age: 26
Setting detail: SPECIMEN
Discharge: HOME | End: 2021-08-31
Payer: COMMERCIAL

## 2021-08-31 VITALS
HEIGHT: 63 IN | HEART RATE: 106 BPM | TEMPERATURE: 97.8 F | WEIGHT: 210 LBS | OXYGEN SATURATION: 98 % | DIASTOLIC BLOOD PRESSURE: 74 MMHG | BODY MASS INDEX: 37.21 KG/M2 | SYSTOLIC BLOOD PRESSURE: 112 MMHG

## 2021-08-31 DIAGNOSIS — J02.0 ACUTE STREPTOCOCCAL PHARYNGITIS: Primary | ICD-10-CM

## 2021-08-31 DIAGNOSIS — J02.9 SORE THROAT: ICD-10-CM

## 2021-08-31 LAB — S PYO AG THROAT QL: POSITIVE

## 2021-08-31 PROCEDURE — 99213 OFFICE O/P EST LOW 20 MIN: CPT | Performed by: NURSE PRACTITIONER

## 2021-08-31 PROCEDURE — 87880 STREP A ASSAY W/OPTIC: CPT | Performed by: NURSE PRACTITIONER

## 2021-08-31 RX ORDER — AZITHROMYCIN 250 MG/1
TABLET, FILM COATED ORAL
Qty: 1 PACKET | Refills: 0 | Status: SHIPPED | OUTPATIENT
Start: 2021-08-31 | End: 2022-08-23 | Stop reason: ALTCHOICE

## 2021-08-31 ASSESSMENT — ENCOUNTER SYMPTOMS
WHEEZING: 0
SORE THROAT: 1
CHEST TIGHTNESS: 0
EYE REDNESS: 0
RHINORRHEA: 1
SHORTNESS OF BREATH: 0
COUGH: 1
EYE DISCHARGE: 0
SINUS PRESSURE: 0
VOICE CHANGE: 0

## 2021-08-31 NOTE — PROGRESS NOTES
needed for Pain 60 tablet 5     No current facility-administered medications for this visit. Allergies   Allergen Reactions    Amoxicillin     Strawberry Extract Swelling     Reviewed PMH, SH, and FH with the patient and updated. Subjective:      Review of Systems   Constitutional: Positive for fatigue. Negative for chills and fever. HENT: Positive for congestion, ear pain, postnasal drip, rhinorrhea and sore throat. Negative for ear discharge, sinus pressure, sneezing and voice change. Eyes: Negative for discharge and redness. Respiratory: Positive for cough. Negative for chest tightness, shortness of breath and wheezing. Cardiovascular: Negative. Negative for chest pain. Musculoskeletal: Negative for myalgias. Skin: Negative for rash. Neurological: Positive for headaches. Negative for dizziness, weakness and light-headedness. Hematological: Negative for adenopathy. All other systems reviewed and are negative. Objective:      Physical Exam  Vitals and nursing note reviewed. Constitutional:       General: She is not in acute distress. Appearance: Normal appearance. She is well-developed. She is not ill-appearing, toxic-appearing or diaphoretic. HENT:      Head: Normocephalic. Right Ear: External ear normal. A middle ear effusion is present. Left Ear: External ear normal. A middle ear effusion is present. Nose: Nose normal.      Right Sinus: No maxillary sinus tenderness or frontal sinus tenderness. Left Sinus: No maxillary sinus tenderness or frontal sinus tenderness. Mouth/Throat:      Pharynx: Posterior oropharyngeal erythema present. No oropharyngeal exudate. Eyes:      General:         Right eye: No discharge. Left eye: No discharge. Cardiovascular:      Rate and Rhythm: Normal rate and regular rhythm. Heart sounds: Normal heart sounds. No murmur heard.      Pulmonary:      Effort: Pulmonary effort is normal. No respiratory distress. Breath sounds: Normal breath sounds. No wheezing or rales. Lymphadenopathy:      Cervical: Cervical adenopathy present. Skin:     General: Skin is warm. Findings: No rash. Neurological:      Mental Status: She is alert. /74 (Site: Right Upper Arm, Position: Sitting, Cuff Size: Large Adult)   Pulse 106   Temp 97.8 °F (36.6 °C) (Temporal)   Ht 5' 3\" (1.6 m)   Wt 210 lb (95.3 kg)   SpO2 98%   BMI 37.20 kg/m²     Results for orders placed or performed in visit on 08/31/21   POCT rapid strep A   Result Value Ref Range    Strep A Ag Positive (A) None Detected     Assessment:       Diagnosis Orders   1. Acute streptococcal pharyngitis  azithromycin (ZITHROMAX Z-KATEY) 250 MG tablet   2. Sore throat  POCT rapid strep A    COVID-19     Plan:      Patient instructed to complete entire antibiotic course. Tylenol/Motrin as needed for fever/discomfort. Change toothbrush in 24 hours. Salt water gargles and throat lozenges if desired. Will send out COVID19 testing. Possible treatment alterations based on the results. Patient instructed to self-quarantine until testing results are back. Patient instructed not to return to work until results are back. Encouraged adequate hydration and rest.  The patient indicates understanding of these issues and agrees with the plan. Educational materials provided on AVS.  Follow up if symptoms do not improve/worsen. Discussed symptoms that will warrant urgent ED evaluation/treatment. Orders Placed This Encounter   Medications    azithromycin (ZITHROMAX Z-KATEY) 250 MG tablet     Sig: Take 2 tabs on day 1 followed by 1 tab on days 2-5. Dispense:  1 packet     Refill:  0        Patient given educational materials - see patient instructions. Discussed use, benefit, and side effects of prescribed medications. All patientquestions answered. Pt voiced understanding.     Electronically signed by JOSE Downey CNP on 8/31/2021at 4:23 PM

## 2021-08-31 NOTE — LETTER
[place] [address]  Phone: [phone]  Fax: [phone]    JOSE Giron CNP        August 31, 2021     Patient: Evelyn Morgan   YOB: 1995   Date of Visit: 8/31/2021       To Whom it May Concern:    León Collier was seen in my clinic on 8/31/2021. Please excuse her absence, she is currently awaiting COVID-19 testing results. If you have any questions or concerns, please don't hesitate to call.     Sincerely,         JOSE Giron CNP

## 2021-08-31 NOTE — PATIENT INSTRUCTIONS
Patient Education        Strep Throat: Care Instructions  Your Care Instructions     Strep throat is a bacterial infection that causes sudden, severe sore throat and fever. Strep throat, which is caused by bacteria called streptococcus, is treated with antibiotics. Sometimes a strep test is necessary to tell if the sore throat is caused by strep bacteria. Treatment can help ease symptoms and may prevent future problems. Follow-up care is a key part of your treatment and safety. Be sure to make and go to all appointments, and call your doctor if you are having problems. It's also a good idea to know your test results and keep a list of the medicines you take. How can you care for yourself at home? · Take your antibiotics as directed. Do not stop taking them just because you feel better. You need to take the full course of antibiotics. · Strep throat can spread to others until 24 hours after you begin taking antibiotics. During this time, avoid contact with other people at work, school, or home, especially infants and children. Do not sneeze or cough on others, and wash your hands often. Keep your drinking glass and eating utensils separate from those of others. Wash these items well in hot, soapy water. · Gargle with warm salt water at least once each hour to help reduce swelling and make your throat feel better. Use 1 teaspoon of salt mixed in 8 fluid ounces of warm water. · Take an over-the-counter pain medication, such as acetaminophen (Tylenol), ibuprofen (Advil, Motrin), or naproxen (Aleve). Read and follow all instructions on the label. · Try an over-the-counter anesthetic throat spray or throat lozenges, which may help relieve throat pain. · Drink plenty of fluids. Fluids may help soothe an irritated throat. Hot fluids, such as tea or soup, may help your throat feel better. · Eat soft solids and drink plenty of clear liquids.  Flavored ice pops, ice cream, scrambled eggs, sherbet, and gelatin dessert (such as Jell-O) may also soothe the throat. · Get lots of rest.  · Do not smoke, and avoid secondhand smoke. If you need help quitting, talk to your doctor about stop-smoking programs and medicines. These can increase your chances of quitting for good. · Use a vaporizer or humidifier to add moisture to the air in your bedroom. Follow the directions for cleaning the machine. When should you call for help? Call your doctor now or seek immediate medical care if:    · You have a new or higher fever.     · You have a fever with a stiff neck or severe headache.     · You have new or worse trouble swallowing.     · Your sore throat gets much worse on one side.     · Your pain becomes much worse on one side of your throat. Watch closely for changes in your health, and be sure to contact your doctor if:    · You are not getting better after 2 days (48 hours).     · You do not get better as expected. Where can you learn more? Go to https://[employer]. org and sign in to your [employer] account. Enter K625 in the [employer] box to learn more about \"Strep Throat: Care Instructions. \"     If you do not have an account, please click on the \"Sign Up Now\" link. Current as of: December 2, 2020               Content Version: 12.9  © 2956-5645 [email or web address]. Care instructions adapted under license by [facility] ([facility]). If you have questions about a medical condition or this instruction, always ask your healthcare professional. [place] [address] any warranty or liability for your use of this information. Patient Education        Learning About Coronavirus [phone])  What is coronavirus (COVID-19)? COVID-19 is a disease caused by a new type of coronavirus. This illness was first found in December 2019. It has since spread worldwide. Coronaviruses are a large group of viruses. They cause the common cold.  They also cause more serious illnesses like [place] respiratory syndrome (MERS) and severe acute respiratory syndrome (SARS). COVID-19 is caused by a novel coronavirus. That means it's a new type that has not been seen in people before. What are the symptoms? Coronavirus (COVID-19) symptoms may include:  · Fever. · Cough. · Trouble breathing. · Chills or repeated shaking with chills. · Muscle pain. · Headache. · Sore throat. · New loss of taste or smell. · Vomiting. · Diarrhea. In severe cases, COVID-19 can cause pneumonia and make it hard to breathe without help from a machine. It can cause death. How is it diagnosed? COVID-19 is diagnosed with a viral test. This may also be called a PCR test or antigen test. It looks for evidence of the virus in your breathing passages or lungs (respiratory system). The test is most often done on a sample from the nose, throat, or lungs. It's sometimes done on a sample of saliva. One way a sample is collected is by putting a long swab into the back of your nose. How is it treated? Mild cases of COVID-19 can be treated at home. Serious cases need treatment in the hospital. Treatment may include medicines to reduce symptoms, plus breathing support such as oxygen therapy or a ventilator. Some people may be placed on their belly to help their oxygen levels. Treatments that may help people who have COVID-19 include:  Antiviral medicines. These medicines treat viral infections. Remdesivir is an example. Immune-based therapy. These medicines help the immune system fight COVID-19. One example is bamlanivimab. It's a monoclonal antibody. Blood thinners. These medicines help prevent blood clots. People with severe illness are at risk for blood clots. How can you protect yourself and others? The best way to protect yourself from getting sick is to:  · Avoid areas where there is an outbreak. · Avoid contact with people who may be infected.   · Avoid crowds and try to stay at least 6 feet away from other people. · Wash your hands often, especially after you cough or sneeze. Use soap and water, and scrub for at least 20 seconds. If soap and water aren't available, use an alcohol-based hand . · Avoid touching your mouth, nose, and eyes. To help avoid spreading the virus to others:  · Stay home if you are sick or have been exposed to the virus. Don't go to school, work, or public areas. And don't use public transportation, ride-shares, or taxis unless you have no choice. · Wear a cloth face cover if you have to go to public areas. · Cover your mouth with a tissue when you cough or sneeze. Then throw the tissue in the trash and wash your hands right away. · If you're sick:  ? Leave your home only if you need to get medical care. But call the doctor's office first so they know you're coming. And wear a face cover. ? Wear the face cover whenever you're around other people. It can help stop the spread of the virus. ? Limit contact with pets and people in your home. If possible, stay in a separate bedroom and use a separate bathroom. ? Clean and disinfect your home every day. Use household  and disinfectant wipes or sprays. Take special care to clean things that you grab with your hands. These include doorknobs, remote controls, phones, and handles on your refrigerator and microwave. And don't forget countertops, tabletops, bathrooms, and computer keyboards. When should you call for help? Call 911 anytime you think you may need emergency care. For example, call if you have life-threatening symptoms, such as:    · You have severe trouble breathing. (You can't talk at all.)     · You have constant chest pain or pressure.     · You are severely dizzy or lightheaded.     · You are confused or can't think clearly.     · Your face and lips have a blue color.     · You pass out (lose consciousness) or are very hard to wake up.    Call your doctor now or seek immediate medical care if:    · You have moderate trouble breathing. (You can't speak a full sentence.)     · You are coughing up blood (more than about 1 teaspoon).     · You have signs of low blood pressure. These include feeling lightheaded; being too weak to stand; and having cold, pale, clammy skin. Watch closely for changes in your health, and be sure to contact your doctor if:    · Your symptoms get worse.     · You are not getting better as expected. Call before you go to the doctor's office. Follow their instructions. And wear a cloth face cover. Current as of: March 26, 2021               Content Version: 12.9  © 2006-2021 [employer]. Care instructions adapted under license by [facility] ([facility]). If you have questions about a medical condition or this instruction, always ask your healthcare professional. [place] [address] any warranty or liability for your use of this information. Patient Education        9 Things To Do If You've Been Exposed to COVID-19    Stay home. If you've been exposed to the virus but don't have symptoms, you may need to stay in quarantine for up to 14 days. In some cases it may be shorter. Ask your doctor when it's safe to end your quarantine. Be sure to follow all instructions from your local health authorities. Don't go to school, work, or public areas. And don't use public transportation, ride-shares, or taxis unless you have no choice. Leave your home only if you need to get medical care. But call the doctor's office first so they know you're coming, and wear a cloth face cover when you go. Call your doctor. Call your doctor or other health professional to let them know that you've been exposed. They might want you to be tested, or they may have other instructions for you. If you become sick, wear a face cover when you are around other people. It can help stop the spread of the virus when you cough or sneeze. Limit contact with people in your home.   If possible, stay in a separate bedroom and use a separate bathroom. Avoid contact with pets and other animals. Cover your mouth and nose with a tissue when you cough or sneeze. Then throw it in the trash right away. Wash your hands often, especially after you cough or sneeze. Use soap and water, and scrub for at least 20 seconds. If soap and water aren't available, use an alcohol-based hand . Don't share personal household items. These include bedding, towels, cups and glasses, and eating utensils. Clean and disinfect your home every day. Use household  or disinfectant wipes or sprays. Take special care to clean things that you grab with your hands. These include doorknobs, remote controls, phones, and handles on your refrigerator and microwave. And don't forget countertops, tabletops, bathrooms, and computer keyboards. Current as of: March 26, 2021               Content Version: 12.9  © 2006-2021 [facility]. Care instructions adapted under license by [facility] ([facility]). If you have questions about a medical condition or this instruction, always ask your healthcare professional. [place] [address] any warranty or liability for your use of this information.

## 2021-09-01 LAB
SARS-COV-2: ABNORMAL
SARS-COV-2: DETECTED
SOURCE: ABNORMAL

## 2021-09-07 ENCOUNTER — PATIENT MESSAGE (OUTPATIENT)
Dept: FAMILY MEDICINE CLINIC | Age: 26
End: 2021-09-07

## 2021-09-07 DIAGNOSIS — R11.0 NAUSEA: Primary | ICD-10-CM

## 2021-09-08 RX ORDER — ONDANSETRON 4 MG/1
4 TABLET, FILM COATED ORAL EVERY 8 HOURS PRN
Qty: 30 TABLET | Refills: 0 | Status: SHIPPED | OUTPATIENT
Start: 2021-09-08

## 2021-09-08 NOTE — TELEPHONE ENCOUNTER
From: June Masson  To:  JOSE Roberts CNP  Sent: 9/7/2021 8:11 PM EDT  Subject: Prescription Question    I have a question every time I get up or wake or when I eat something I throw it up and I am on day 8 of have Covid

## 2021-09-09 RX ORDER — DROSPIRENONE AND ETHINYL ESTRADIOL 0.03MG-3MG
KIT ORAL
Qty: 28 TABLET | Refills: 2 | Status: SHIPPED | OUTPATIENT
Start: 2021-09-09

## 2021-09-10 ENCOUNTER — PATIENT MESSAGE (OUTPATIENT)
Dept: FAMILY MEDICINE CLINIC | Age: 26
End: 2021-09-10

## 2021-09-10 DIAGNOSIS — R11.0 NAUSEA: Primary | ICD-10-CM

## 2021-09-11 NOTE — TELEPHONE ENCOUNTER
From: Kevin Putnam  To: JOSE Murdock CNP  Sent: 9/10/2021 7:38 PM EDT  Subject: Prescription Question    The pills you said me over for the The vomiting is not helping I am still get sick ever time I eat.  Is this there a shot I can get to help me with it

## 2021-09-12 RX ORDER — PROMETHAZINE HYDROCHLORIDE 12.5 MG/1
12.5 TABLET ORAL 4 TIMES DAILY PRN
Qty: 20 TABLET | Refills: 0 | Status: SHIPPED | OUTPATIENT
Start: 2021-09-12 | End: 2021-09-19

## 2021-09-27 DIAGNOSIS — F90.9 ATTENTION DEFICIT HYPERACTIVITY DISORDER (ADHD), UNSPECIFIED ADHD TYPE: Primary | ICD-10-CM

## 2021-09-27 RX ORDER — ATOMOXETINE 40 MG/1
CAPSULE ORAL
Qty: 30 CAPSULE | Refills: 1 | Status: SHIPPED | OUTPATIENT
Start: 2021-09-27

## 2022-05-18 DIAGNOSIS — G89.29 CHRONIC MIDLINE LOW BACK PAIN WITHOUT SCIATICA: ICD-10-CM

## 2022-05-18 DIAGNOSIS — R51.9 ACUTE INTRACTABLE HEADACHE, UNSPECIFIED HEADACHE TYPE: ICD-10-CM

## 2022-05-18 DIAGNOSIS — M54.50 CHRONIC MIDLINE LOW BACK PAIN WITHOUT SCIATICA: ICD-10-CM

## 2022-05-18 RX ORDER — CYCLOBENZAPRINE HCL 10 MG
TABLET ORAL
Qty: 30 TABLET | Refills: 0 | Status: SHIPPED | OUTPATIENT
Start: 2022-05-18

## 2022-06-17 ENCOUNTER — APPOINTMENT (OUTPATIENT)
Dept: ULTRASOUND IMAGING | Age: 27
End: 2022-06-17
Payer: COMMERCIAL

## 2022-06-17 ENCOUNTER — HOSPITAL ENCOUNTER (EMERGENCY)
Age: 27
Discharge: HOME | End: 2022-06-17
Attending: EMERGENCY MEDICINE
Payer: COMMERCIAL

## 2022-06-17 VITALS
TEMPERATURE: 99.3 F | HEIGHT: 63 IN | BODY MASS INDEX: 37.39 KG/M2 | OXYGEN SATURATION: 97 % | DIASTOLIC BLOOD PRESSURE: 89 MMHG | RESPIRATION RATE: 16 BRPM | SYSTOLIC BLOOD PRESSURE: 139 MMHG | HEART RATE: 107 BPM | WEIGHT: 211 LBS

## 2022-06-17 DIAGNOSIS — N93.8 DUB (DYSFUNCTIONAL UTERINE BLEEDING): Primary | ICD-10-CM

## 2022-06-17 LAB
-: ABNORMAL
ABSOLUTE EOS #: <0.03 K/UL (ref 0–0.44)
ABSOLUTE IMMATURE GRANULOCYTE: 0.04 K/UL (ref 0–0.3)
ABSOLUTE LYMPH #: 1.93 K/UL (ref 1.1–3.7)
ABSOLUTE MONO #: 0.47 K/UL (ref 0.1–1.2)
ANION GAP SERPL CALCULATED.3IONS-SCNC: 11 MMOL/L (ref 9–17)
BACTERIA: ABNORMAL
BASOPHILS # BLD: 0 % (ref 0–2)
BASOPHILS ABSOLUTE: <0.03 K/UL (ref 0–0.2)
BILIRUBIN URINE: NEGATIVE
BUN BLDV-MCNC: 8 MG/DL (ref 6–20)
BUN/CREAT BLD: 12 (ref 9–20)
CALCIUM SERPL-MCNC: 9.3 MG/DL (ref 8.6–10.4)
CHLORIDE BLD-SCNC: 103 MMOL/L (ref 98–107)
CHP ED QC CHECK: YES
CO2: 24 MMOL/L (ref 20–31)
COLOR: YELLOW
CREAT SERPL-MCNC: 0.66 MG/DL (ref 0.5–0.9)
EOSINOPHILS RELATIVE PERCENT: 0 % (ref 1–4)
EPITHELIAL CELLS UA: ABNORMAL /HPF (ref 0–5)
GFR AFRICAN AMERICAN: >60 ML/MIN
GFR NON-AFRICAN AMERICAN: >60 ML/MIN
GFR SERPL CREATININE-BSD FRML MDRD: ABNORMAL ML/MIN/{1.73_M2}
GLUCOSE BLD-MCNC: 103 MG/DL (ref 70–99)
GLUCOSE URINE: NEGATIVE
HCT VFR BLD CALC: 39.4 % (ref 36.3–47.1)
HEMOGLOBIN: 12.9 G/DL (ref 11.9–15.1)
IMMATURE GRANULOCYTES: 1 %
KETONES, URINE: NEGATIVE
LEUKOCYTE ESTERASE, URINE: NEGATIVE
LYMPHOCYTES # BLD: 32 % (ref 24–43)
MCH RBC QN AUTO: 28.8 PG (ref 25.2–33.5)
MCHC RBC AUTO-ENTMCNC: 32.7 G/DL (ref 28.4–34.8)
MCV RBC AUTO: 87.9 FL (ref 82.6–102.9)
MONOCYTES # BLD: 8 % (ref 3–12)
NITRITE, URINE: NEGATIVE
NRBC AUTOMATED: 0 PER 100 WBC
PDW BLD-RTO: 13.1 % (ref 11.8–14.4)
PH UA: 7 (ref 5–8)
PLATELET # BLD: 359 K/UL (ref 138–453)
PMV BLD AUTO: 9.5 FL (ref 8.1–13.5)
POTASSIUM SERPL-SCNC: 4 MMOL/L (ref 3.7–5.3)
PREGNANCY TEST URINE, POC: NEGATIVE
PROTEIN UA: NEGATIVE
RBC # BLD: 4.48 M/UL (ref 3.95–5.11)
RBC UA: ABNORMAL /HPF (ref 0–2)
SEG NEUTROPHILS: 59 % (ref 36–65)
SEGMENTED NEUTROPHILS ABSOLUTE COUNT: 3.64 K/UL (ref 1.5–8.1)
SODIUM BLD-SCNC: 138 MMOL/L (ref 135–144)
SPECIFIC GRAVITY UA: 1.01 (ref 1–1.03)
TURBIDITY: ABNORMAL
URINE HGB: ABNORMAL
UROBILINOGEN, URINE: NORMAL
WBC # BLD: 6.1 K/UL (ref 3.5–11.3)
WBC UA: ABNORMAL /HPF (ref 0–5)

## 2022-06-17 PROCEDURE — 99284 EMERGENCY DEPT VISIT MOD MDM: CPT

## 2022-06-17 PROCEDURE — 93976 VASCULAR STUDY: CPT

## 2022-06-17 PROCEDURE — 81001 URINALYSIS AUTO W/SCOPE: CPT

## 2022-06-17 PROCEDURE — 85025 COMPLETE CBC W/AUTO DIFF WBC: CPT

## 2022-06-17 PROCEDURE — 76830 TRANSVAGINAL US NON-OB: CPT

## 2022-06-17 PROCEDURE — 81025 URINE PREGNANCY TEST: CPT

## 2022-06-17 PROCEDURE — 76856 US EXAM PELVIC COMPLETE: CPT

## 2022-06-17 PROCEDURE — 80048 BASIC METABOLIC PNL TOTAL CA: CPT

## 2022-06-17 ASSESSMENT — PAIN DESCRIPTION - LOCATION: LOCATION: PELVIS;HEAD

## 2022-06-17 ASSESSMENT — PAIN SCALES - GENERAL: PAINLEVEL_OUTOF10: 10

## 2022-06-17 ASSESSMENT — PAIN - FUNCTIONAL ASSESSMENT: PAIN_FUNCTIONAL_ASSESSMENT: 0-10

## 2022-06-17 ASSESSMENT — PAIN DESCRIPTION - DESCRIPTORS: DESCRIPTORS: CRAMPING;ACHING

## 2022-06-17 ASSESSMENT — PAIN DESCRIPTION - FREQUENCY: FREQUENCY: CONTINUOUS

## 2022-06-17 NOTE — ED PROVIDER NOTES
eMERGENCY dEPARTMENT eNCOUnter   Independent Attestation     Pt Name: Chanel Elizondo  MRN: [id number]  [place] 1995  Date of evaluation: 6/17/22     Chanel Elizondo is a 27 y.o. female with CC: Menorrhagia ( states BC ineffective)    Heavy menses, seeing GYN currently trying different contraceptives for better management. H&H stable. This visit was performed by both a physician and an APC. I performed all aspects of the MDM as documented. The care is provided during an unprecedented national emergency due to the novel coronavirus, COVID 19.     Samm Morse DO  Attending Emergency Physician                    Marina Perry DO  06/17/22 [id number]

## 2022-06-17 NOTE — ED PROVIDER NOTES
[facility]  eMERGENCY dEPARTMENTeNCOUnter      Pt Name: Mandie Villegas  MRN: [id number]  [place] 1995  Date ofevaluation: 6/17/2022  Provider: Ramon Boykin PA-C    CHIEF COMPLAINT       Chief Complaint   Patient presents with    Menorrhagia      states BC ineffective         HISTORY OF PRESENT ILLNESS  (Location/Symptom, Timing/Onset, Context/Setting, Quality, Duration, Modifying Factors, Severity.)   Mandie Villegas is a 27 y.o. female who presents to the emergency department with vaginal bleeding over the last 4 to 5 days. Patient reports having menstrual periods. Reported that she has been trying different birth control to help treat her heavy menstrual cycles. Started a new birth control  A few weeks ago. Reports some cramping. Nursing Notes were reviewed. ALLERGIES     Amoxicillin and Strawberry extract    CURRENT MEDICATIONS       Previous Medications    ATOMOXETINE (STRATTERA) 40 MG CAPSULE    TAKE 1 CAPSULE BY MOUTH EVERY DAY    AZITHROMYCIN (ZITHROMAX Z-KATEY) 250 MG TABLET    Take 2 tabs on day 1 followed by 1 tab on days 2-5.     CYCLOBENZAPRINE (FLEXERIL) 10 MG TABLET    TAKE 1 TABLET BY MOUTH EVERY DAY AT NIGHT AS NEEDED FOR MUSCLE SPASM    DROSPIRENONE-ETHINYL ESTRADIOL 3-0.03 MG TABS    TAKE 1 TABLET BY MOUTH ONE TIME A DAY    FLUTICASONE (FLONASE) 50 MCG/ACT NASAL SPRAY    2 sprays by Nasal route daily    NAPROXEN (NAPROSYN) 500 MG TABLET    Take 1 tablet by mouth 2 times daily as needed for Pain    ONDANSETRON (ZOFRAN ODT) 4 MG DISINTEGRATING TABLET    Take 1 tablet by mouth every 6 hours as needed for Nausea or Vomiting    ONDANSETRON (ZOFRAN) 4 MG TABLET    Take 1 tablet by mouth every 8 hours as needed for Nausea or Vomiting (1- 2TABS EVERY 8 HOURS PRN)    VENLAFAXINE (EFFEXOR XR) 75 MG EXTENDED RELEASE CAPSULE    Take 2 capsules by mouth daily       PAST MEDICAL HISTORY         Diagnosis Date    Acne vulgaris     ADHD     Congenital benign skin mole left hand    Depression 10/2020    Suicidal Ideation    Hyperlipidemia     Hypopigmented skin lesion     left hip    Seborrheic dermatitis     Candy-Parkinson-White (WPW) pattern     Had ablation 2012       SURGICAL HISTORY           Procedure Laterality Date    ATRIAL ABLATION SURGERY  2012    due to WPW    EXCISION/BIOPSY Left 2020    HAND LESION BIOPSY EXCISION - Left    HAND SURGERY Left 2020    HAND LESION BIOPSY EXCISION performed by Karen Lacy MD [address]           Problem Relation Age of Onset    High Blood Pressure Mother     Seizures Father     Lung Cancer Paternal Grandmother     Lung Cancer Paternal Grandfather      Family Status   Relation Name Status    Mother  Alive    Father  Alive    PGM  Alive    PGF          SOCIAL HISTORY      reports that she has never smoked. She has never used smokeless tobacco. She reports previous alcohol use. She reports that she does not use drugs. REVIEW OFSYSTEMS    (2-9 systems for level 4, 10 or more for level 5)   Review of Systems    Except as noted above the remainder of the review of systems was reviewed and negative. PHYSICAL EXAM    (up to 7 for level 4, 8 or more for level 5)     ED Triage Vitals [22 1523]   BP Temp Temp Source Heart Rate Resp SpO2 Height Weight   139/89 99.3 °F (37.4 °C) Oral (!) 107 16 97 % 5' 3\" (1.6 m) 211 lb (95.7 kg)      Physical Exam  Constitutional:       Appearance: She is well-developed. HENT:      Head: Normocephalic and atraumatic. Cardiovascular:      Rate and Rhythm: Normal rate and regular rhythm. Pulmonary:      Effort: Pulmonary effort is normal.      Breath sounds: Normal breath sounds. Abdominal:      General: There is no distension. Palpations: Abdomen is soft. Tenderness: There is no abdominal tenderness. Genitourinary:      Musculoskeletal:         General: Normal range of motion.       Cervical back: Normal range of motion and neck supple. Skin:     General: Skin is warm. Findings: No rash. Neurological:      Mental Status: She is alert and oriented to person, place, and time. Psychiatric:         Behavior: Behavior normal.                 DIAGNOSTIC RESULTS     EKG: All EKG's are interpreted by the Emergency Department Physician who either signs or Co-signs this chart in the absence of a cardiologist.        RADIOLOGY:   Non-plain film images such as CT, Ultrasound and MRI are read by the radiologist. Plain radiographic images arevisualized and preliminarily interpreted by the emergency physician with the below findings:        Interpretation per the Radiologist below, if available at thetime of this note:          ED BEDSIDE ULTRASOUND:   Performed by ED Physician - none    LABS:  Labs Reviewed   CBC WITH AUTO DIFFERENTIAL - Abnormal; Notable for the following components:       Result Value    Eosinophils % 0 (*)     Immature Granulocytes 1 (*)     All other components within normal limits   BASIC METABOLIC PANEL - Abnormal; Notable for the following components:    Glucose 103 (*)     All other components within normal limits   URINALYSIS WITH REFLEX TO CULTURE - Abnormal; Notable for the following components:    Turbidity UA Cloudy (*)     Urine Hgb 3+ (*)     All other components within normal limits   MICROSCOPIC URINALYSIS - Abnormal; Notable for the following components:    Bacteria, UA RARE (*)     All other components within normal limits   POCT URINE PREGNANCY - Normal       All other labs were within normal range or not returned as of this dictation. EMERGENCY DEPARTMENT COURSE and DIFFERENTIAL DIAGNOSIS/MDM:   Vitals:    Vitals:    06/17/22 1523   BP: 139/89   Pulse: (!) 107   Resp: 16   Temp: 99.3 °F (37.4 °C)   TempSrc: Oral   SpO2: 97%   Weight: 211 lb (95.7 kg)   Height: 5' 3\" (1.6 m)     Ultrasound negative. No signs of significant anemia on blood work.   Patient is hemodynamically stable.     CONSULTS:  None    PROCEDURES:  Procedures        FINAL IMPRESSION      1. DUB (dysfunctional uterine bleeding)          DISPOSITION/PLAN   DISPOSITION Decision To Discharge 06/17/2022 05:32:53 PM      PATIENTREFERRED TO:   Ilda Palafox, [address]  [phone]    In 3 days        DISCHARGE MEDICATIONS:     New Prescriptions    No medications on file           (Please note that portions of this note were completed with a voice recognition program.  Efforts were made to edit thedictations but occasionally words are mis-transcribed.)    KIM Antoine PA-C  06/17/22 [id number]

## 2022-06-20 LAB — HCG, PREGNANCY URINE (POC): NEGATIVE

## 2022-08-23 ENCOUNTER — OFFICE VISIT (OUTPATIENT)
Dept: PRIMARY CARE CLINIC | Age: 27
End: 2022-08-23
Payer: COMMERCIAL

## 2022-08-23 VITALS
TEMPERATURE: 99 F | OXYGEN SATURATION: 98 % | SYSTOLIC BLOOD PRESSURE: 130 MMHG | HEART RATE: 102 BPM | DIASTOLIC BLOOD PRESSURE: 90 MMHG

## 2022-08-23 DIAGNOSIS — J02.9 SORE THROAT: ICD-10-CM

## 2022-08-23 DIAGNOSIS — B96.89 ACUTE BACTERIAL SINUSITIS: Primary | ICD-10-CM

## 2022-08-23 DIAGNOSIS — J01.90 ACUTE BACTERIAL SINUSITIS: Primary | ICD-10-CM

## 2022-08-23 DIAGNOSIS — G43.709 CHRONIC MIGRAINE WITHOUT AURA WITHOUT STATUS MIGRAINOSUS, NOT INTRACTABLE: ICD-10-CM

## 2022-08-23 LAB — S PYO AG THROAT QL: NORMAL

## 2022-08-23 PROCEDURE — 87880 STREP A ASSAY W/OPTIC: CPT

## 2022-08-23 PROCEDURE — 99213 OFFICE O/P EST LOW 20 MIN: CPT

## 2022-08-23 RX ORDER — AZITHROMYCIN 250 MG/1
250 TABLET, FILM COATED ORAL SEE ADMIN INSTRUCTIONS
Qty: 6 TABLET | Refills: 0 | Status: SHIPPED | OUTPATIENT
Start: 2022-08-23 | End: 2022-08-28

## 2022-08-23 ASSESSMENT — ENCOUNTER SYMPTOMS
DIARRHEA: 0
STRIDOR: 0
PHOTOPHOBIA: 0
CHANGE IN BOWEL HABIT: 0
FACIAL SWELLING: 0
VOMITING: 0
CONSTIPATION: 0
RESPIRATORY NEGATIVE: 1
SINUS PAIN: 0
EYE PAIN: 0
VISUAL CHANGE: 0
SORE THROAT: 1
SINUS PRESSURE: 0
SWOLLEN GLANDS: 0
CHEST TIGHTNESS: 0
EYE DISCHARGE: 0
RECTAL PAIN: 0
EYES NEGATIVE: 1
NAUSEA: 0
BACK PAIN: 0
VOICE CHANGE: 0
COLOR CHANGE: 0
GASTROINTESTINAL NEGATIVE: 1
APNEA: 0
SHORTNESS OF BREATH: 0
ABDOMINAL PAIN: 0
ANAL BLEEDING: 0
RHINORRHEA: 1
EYE REDNESS: 0
COUGH: 0
WHEEZING: 0
TROUBLE SWALLOWING: 0
BLOOD IN STOOL: 0
EYE ITCHING: 0
ABDOMINAL DISTENTION: 0
CHOKING: 0

## 2022-08-23 ASSESSMENT — PATIENT HEALTH QUESTIONNAIRE - PHQ9
SUM OF ALL RESPONSES TO PHQ QUESTIONS 1-9: 0
1. LITTLE INTEREST OR PLEASURE IN DOING THINGS: 0
SUM OF ALL RESPONSES TO PHQ9 QUESTIONS 1 & 2: 0
2. FEELING DOWN, DEPRESSED OR HOPELESS: 0
SUM OF ALL RESPONSES TO PHQ QUESTIONS 1-9: 0

## 2022-08-23 NOTE — PROGRESS NOTES
[address] WALK IN CARE  [facility] [address] [facility] WALK IN CARE  100 [facility]  [address] [place] [address]  Dept: [id number] Michelle Morgan is a 27 y.o. female Established patient, who presents to the walk-in clinic today with conditions/complaints as noted below:    Chief Complaint   Patient presents with    Pharyngitis     Sore throat x 10 days, headache          HPI:     Pharyngitis  This is a new problem. Episode onset: 10 days ago. The problem occurs constantly. The problem has been gradually worsening. Associated symptoms include headaches, a rash and a sore throat. Pertinent negatives include no abdominal pain, anorexia, arthralgias, change in bowel habit, chest pain, chills, congestion, coughing, diaphoresis, fatigue, fever, joint swelling, myalgias, nausea, neck pain, numbness, swollen glands, urinary symptoms, vertigo, visual change, vomiting or weakness. She has tried NSAIDs for the symptoms. The treatment provided mild relief. Pt also requesting referral to Neurology for chronic migraines that are not relieved with Imitrex or Fioricet.    Past Medical History:   Diagnosis Date    Acne vulgaris     ADHD     Congenital benign skin mole     left hand    Depression 10/2020    Suicidal Ideation    Hyperlipidemia     Hypopigmented skin lesion     left hip    Seborrheic dermatitis     Candy-Parkinson-White (WPW) pattern     Had ablation 09/2012       Current Outpatient Medications   Medication Sig Dispense Refill    azithromycin (ZITHROMAX) 250 MG tablet Take 1 tablet by mouth See Admin Instructions for 5 days 500mg on day 1 followed by 250mg on days 2 - 5 6 tablet 0    cyclobenzaprine (FLEXERIL) 10 MG tablet TAKE 1 TABLET BY MOUTH EVERY DAY AT NIGHT AS NEEDED FOR MUSCLE SPASM 30 tablet 0    atomoxetine (STRATTERA) 40 MG capsule TAKE 1 CAPSULE BY MOUTH EVERY DAY 30 capsule 1    drospirenone-ethinyl estradiol 3-0.03 MG TABS TAKE 1 TABLET BY MOUTH ONE TIME A DAY 28 tablet 2    venlafaxine (EFFEXOR XR) 75 MG extended release capsule Take 2 capsules by mouth daily 30 capsule 5    ondansetron (ZOFRAN) 4 MG tablet Take 1 tablet by mouth every 8 hours as needed for Nausea or Vomiting (1- 2TABS EVERY 8 HOURS PRN) (Patient not taking: Reported on 8/23/2022) 30 tablet 0    ondansetron (ZOFRAN ODT) 4 MG disintegrating tablet Take 1 tablet by mouth every 6 hours as needed for Nausea or Vomiting (Patient not taking: Reported on 8/23/2022) 15 tablet 0    fluticasone (FLONASE) 50 MCG/ACT nasal spray 2 sprays by Nasal route daily (Patient not taking: Reported on 8/23/2022) 1 Bottle 0    naproxen (NAPROSYN) 500 MG tablet Take 1 tablet by mouth 2 times daily as needed for Pain (Patient not taking: Reported on 8/23/2022) 60 tablet 5     No current facility-administered medications for this visit. Allergies   Allergen Reactions    Amoxicillin     Strawberry Extract Swelling       Review of Systems:     Review of Systems   Constitutional: Negative. Negative for activity change, appetite change, chills, diaphoresis, fatigue, fever and unexpected weight change. HENT:  Positive for rhinorrhea and sore throat. Negative for congestion, dental problem, drooling, ear discharge, ear pain, facial swelling, hearing loss, mouth sores, nosebleeds, postnasal drip, sinus pressure, sinus pain, sneezing, tinnitus, trouble swallowing and voice change. Eyes: Negative. Negative for photophobia, pain, discharge, redness, itching and visual disturbance. Respiratory: Negative. Negative for apnea, cough, choking, chest tightness, shortness of breath, wheezing and stridor. Cardiovascular: Negative. Negative for chest pain, palpitations and leg swelling. Gastrointestinal: Negative.   Negative for abdominal distention, abdominal pain, anal bleeding, anorexia, blood in stool, change in bowel habit, constipation, diarrhea, nausea, rectal pain and vomiting. Musculoskeletal: Negative. Negative for arthralgias, back pain, gait problem, joint swelling, myalgias, neck pain and neck stiffness. Skin:  Positive for rash. Negative for color change, pallor and wound. Neurological:  Positive for headaches. Negative for dizziness, vertigo, tremors, seizures, syncope, facial asymmetry, speech difficulty, weakness, light-headedness and numbness. Physical Exam:      BP (!) 130/90 (Site: Left Upper Arm, Position: Sitting, Cuff Size: Medium Adult)   Pulse (!) 102   Temp 99 °F (37.2 °C) (Tympanic)   SpO2 98%     Physical Exam    Plan:          1. Acute bacterial sinusitis  -     azithromycin (ZITHROMAX) 250 MG tablet; Take 1 tablet by mouth See Admin Instructions for 5 days 500mg on day 1 followed by 250mg on days 2 - 5, Disp-6 tablet, R-0Normal  2. Sore throat  -     POCT rapid strep A  3. Chronic migraine without aura without status migrainosus, not intractable  -     Antonia Nicole [place], Neurology, [facility]     Results for POC orders placed in visit on 08/23/22   POCT rapid strep A   Result Value Ref Range    Strep A Ag None Detected None Detected     Continue over-the-counter medications as needed for symptoms. Use honey to the back of throat, salt water gargle as needed for sore throat, cough. Go to the ER for shortness of breath, chest pain. Patient verbalized understanding. Follow Up Instructions:      Return if symptoms worsen or fail to improve, for SOB, chest pain go to ER. Orders Placed This Encounter   Medications    azithromycin (ZITHROMAX) 250 MG tablet     Sig: Take 1 tablet by mouth See Admin Instructions for 5 days 500mg on day 1 followed by 250mg on days 2 - 5     Dispense:  6 tablet     Refill:  0             Based on the duration and severity of the symptoms-- I will treat this as bacterial at this time.   Patient instructed to complete antibiotic prescription fully. May use Motrin/Tylenol for fever/pain. Saline washes, salt water gargles and over the counter preparations if desired. Patient agreeable to treatment plan. Educational materials provided on AVS.  Follow up if symptoms do not improve/worsen. Patient and/or parent given educational materials - see patient instructions. Discussed use, benefit, and side effects of prescribed medications. All patient questions answered. Patient and/or parent voiced understanding.       Electronically signed by JOSE Alegria 8/23/2022 at 6:06 PM

## 2022-09-26 ENCOUNTER — HOSPITAL ENCOUNTER (OUTPATIENT)
Age: 27
Setting detail: SPECIMEN
Discharge: HOME | End: 2022-09-26

## 2022-09-26 ENCOUNTER — OFFICE VISIT (OUTPATIENT)
Dept: PRIMARY CARE CLINIC | Age: 27
End: 2022-09-26
Payer: COMMERCIAL

## 2022-09-26 VITALS
TEMPERATURE: 98.6 F | SYSTOLIC BLOOD PRESSURE: 129 MMHG | BODY MASS INDEX: 37.02 KG/M2 | DIASTOLIC BLOOD PRESSURE: 82 MMHG | WEIGHT: 209 LBS | HEART RATE: 92 BPM | OXYGEN SATURATION: 96 %

## 2022-09-26 DIAGNOSIS — R30.9 PAIN WITH URINATION: ICD-10-CM

## 2022-09-26 DIAGNOSIS — N30.00 ACUTE CYSTITIS WITHOUT HEMATURIA: Primary | ICD-10-CM

## 2022-09-26 DIAGNOSIS — N30.00 ACUTE CYSTITIS WITHOUT HEMATURIA: ICD-10-CM

## 2022-09-26 LAB
APPEARANCE FLUID: ABNORMAL
BILIRUBIN, POC: NEGATIVE
BLOOD URINE, POC: NEGATIVE
CLARITY, POC: ABNORMAL
COLOR, POC: YELLOW
GLUCOSE URINE, POC: NEGATIVE
KETONES, POC: NEGATIVE
LEUKOCYTE EST, POC: ABNORMAL
NITRITE, POC: NEGATIVE
PH, POC: 6.5
PROTEIN, POC: NEGATIVE
SPECIFIC GRAVITY, POC: 1.02
UROBILINOGEN, POC: 0.2

## 2022-09-26 PROCEDURE — 99213 OFFICE O/P EST LOW 20 MIN: CPT

## 2022-09-26 PROCEDURE — 81002 URINALYSIS NONAUTO W/O SCOPE: CPT

## 2022-09-26 RX ORDER — PHENAZOPYRIDINE HYDROCHLORIDE 100 MG/1
100 TABLET, FILM COATED ORAL 3 TIMES DAILY PRN
Qty: 21 TABLET | Refills: 0 | Status: SHIPPED | OUTPATIENT
Start: 2022-09-26 | End: 2022-10-03

## 2022-09-26 RX ORDER — SULFAMETHOXAZOLE AND TRIMETHOPRIM 800; 160 MG/1; MG/1
1 TABLET ORAL 2 TIMES DAILY
Qty: 14 TABLET | Refills: 0 | Status: SHIPPED | OUTPATIENT
Start: 2022-09-26 | End: 2022-10-03

## 2022-09-26 RX ORDER — METFORMIN HYDROCHLORIDE 500 MG/5ML
SOLUTION ORAL
COMMUNITY
Start: 2022-03-04

## 2022-09-26 SDOH — ECONOMIC STABILITY: FOOD INSECURITY: WITHIN THE PAST 12 MONTHS, YOU WORRIED THAT YOUR FOOD WOULD RUN OUT BEFORE YOU GOT MONEY TO BUY MORE.: NEVER TRUE

## 2022-09-26 SDOH — ECONOMIC STABILITY: FOOD INSECURITY: WITHIN THE PAST 12 MONTHS, THE FOOD YOU BOUGHT JUST DIDN'T LAST AND YOU DIDN'T HAVE MONEY TO GET MORE.: NEVER TRUE

## 2022-09-26 ASSESSMENT — ENCOUNTER SYMPTOMS
RESPIRATORY NEGATIVE: 1
FACIAL SWELLING: 0
TROUBLE SWALLOWING: 0
BLOOD IN STOOL: 0
APNEA: 0
RECTAL PAIN: 0
STRIDOR: 0
SINUS PAIN: 0
EYE REDNESS: 0
SHORTNESS OF BREATH: 0
EYE PAIN: 0
EYES NEGATIVE: 1
VOICE CHANGE: 0
ANAL BLEEDING: 0
PHOTOPHOBIA: 0
GASTROINTESTINAL NEGATIVE: 1
SORE THROAT: 0
WHEEZING: 0
EYE DISCHARGE: 0
DIARRHEA: 0
ABDOMINAL DISTENTION: 0
CHOKING: 0
BACK PAIN: 0
COUGH: 0
CONSTIPATION: 0
CHEST TIGHTNESS: 0
SINUS PRESSURE: 0
EYE ITCHING: 0
COLOR CHANGE: 0
VOMITING: 0
ABDOMINAL PAIN: 0
NAUSEA: 0
RHINORRHEA: 0

## 2022-09-26 ASSESSMENT — SOCIAL DETERMINANTS OF HEALTH (SDOH): HOW HARD IS IT FOR YOU TO PAY FOR THE VERY BASICS LIKE FOOD, HOUSING, MEDICAL CARE, AND HEATING?: NOT HARD AT ALL

## 2022-09-26 NOTE — PROGRESS NOTES
[address] WALK IN CARE  [facility] [address] [facility] WALK IN [address]  [place] [address]  Dept: [id number] Michelle Morgan is a 27 y.o. female Established patient, who presents to the walk-in clinic today with conditions/complaints as noted below:    Chief Complaint   Patient presents with    Dysuria     3 days         HPI:     Dysuria   This is a new problem. Episode onset: 3 days ago. The problem occurs every urination. The problem has been gradually worsening. The quality of the pain is described as burning. The pain is severe. There has been no fever. She is Not sexually active. There is A history of pyelonephritis. Pertinent negatives include no chills, discharge, flank pain, frequency, hematuria, hesitancy, nausea, possible pregnancy, sweats, urgency or vomiting. She has tried increased fluids (Cranberry juice) for the symptoms. The treatment provided no relief.      LMP- approx first week Sept.   Past Medical History:   Diagnosis Date    Acne vulgaris     ADHD     Congenital benign skin mole     left hand    Depression 10/2020    Suicidal Ideation    Hyperlipidemia     Hypopigmented skin lesion     left hip    Seborrheic dermatitis     Candy-Parkinson-White (WPW) pattern     Had ablation 09/2012       Current Outpatient Medications   Medication Sig Dispense Refill    metFORMIN HCl 500 MG/5ML SOLN 1 tablet with a meal      sulfamethoxazole-trimethoprim (BACTRIM DS;SEPTRA DS) 800-160 MG per tablet Take 1 tablet by mouth 2 times daily for 7 days 14 tablet 0    phenazopyridine (PYRIDIUM) 100 MG tablet Take 1 tablet by mouth 3 times daily as needed for Pain 21 tablet 0    atomoxetine (STRATTERA) 40 MG capsule TAKE 1 CAPSULE BY MOUTH EVERY DAY 30 capsule 1    drospirenone-ethinyl estradiol 3-0.03 MG TABS TAKE 1 TABLET BY MOUTH ONE TIME A DAY 28 tablet 2    venlafaxine (EFFEXOR XR) 75 MG extended release capsule Take 2 capsules by mouth daily 30 capsule 5    naproxen (NAPROSYN) 500 MG tablet Take 1 tablet by mouth 2 times daily as needed for Pain 60 tablet 5    cyclobenzaprine (FLEXERIL) 10 MG tablet TAKE 1 TABLET BY MOUTH EVERY DAY AT NIGHT AS NEEDED FOR MUSCLE SPASM (Patient not taking: Reported on 9/26/2022) 30 tablet 0    ondansetron (ZOFRAN) 4 MG tablet Take 1 tablet by mouth every 8 hours as needed for Nausea or Vomiting (1- 2TABS EVERY 8 HOURS PRN) (Patient not taking: No sig reported) 30 tablet 0    ondansetron (ZOFRAN ODT) 4 MG disintegrating tablet Take 1 tablet by mouth every 6 hours as needed for Nausea or Vomiting (Patient not taking: No sig reported) 15 tablet 0    fluticasone (FLONASE) 50 MCG/ACT nasal spray 2 sprays by Nasal route daily (Patient not taking: No sig reported) 1 Bottle 0     No current facility-administered medications for this visit. Allergies   Allergen Reactions    Amoxicillin     Strawberry Extract Swelling       Review of Systems:     Review of Systems   Constitutional: Negative. Negative for activity change, appetite change, chills, diaphoresis, fatigue, fever and unexpected weight change. HENT: Negative. Negative for congestion, dental problem, drooling, ear discharge, ear pain, facial swelling, hearing loss, mouth sores, nosebleeds, postnasal drip, rhinorrhea, sinus pressure, sinus pain, sneezing, sore throat, tinnitus, trouble swallowing and voice change. Eyes: Negative. Negative for photophobia, pain, discharge, redness, itching and visual disturbance. Respiratory: Negative. Negative for apnea, cough, choking, chest tightness, shortness of breath, wheezing and stridor. Cardiovascular: Negative. Negative for chest pain, palpitations and leg swelling. Gastrointestinal: Negative.   Negative for abdominal distention, abdominal pain, anal bleeding, blood in stool, constipation, diarrhea, nausea, rectal pain and vomiting. Genitourinary:  Positive for dysuria. Negative for decreased urine volume, difficulty urinating, dyspareunia, enuresis, flank pain, frequency, genital sores, hematuria, hesitancy, menstrual problem, pelvic pain, urgency, vaginal bleeding, vaginal discharge and vaginal pain. Musculoskeletal: Negative. Negative for arthralgias, back pain, gait problem, joint swelling, myalgias, neck pain and neck stiffness. Skin: Negative. Negative for color change, pallor, rash and wound. Neurological: Negative. Negative for dizziness, tremors, seizures, syncope, facial asymmetry, speech difficulty, weakness, light-headedness, numbness and headaches. Physical Exam:      /82   Pulse 92   Temp 98.6 °F (37 °C)   Wt 209 lb (94.8 kg)   SpO2 96%   BMI 37.02 kg/m²     Physical Exam  Vitals reviewed. Constitutional:       Appearance: Normal appearance. She is normal weight. HENT:      Head: Normocephalic and atraumatic. Right Ear: Tympanic membrane, ear canal and external ear normal.      Left Ear: Tympanic membrane, ear canal and external ear normal.      Nose: Nose normal.      Mouth/Throat:      Mouth: Mucous membranes are moist.      Pharynx: Oropharynx is clear. Eyes:      Extraocular Movements: Extraocular movements intact. Conjunctiva/sclera: Conjunctivae normal.      Pupils: Pupils are equal, round, and reactive to light. Cardiovascular:      Rate and Rhythm: Normal rate and regular rhythm. Pulses: Normal pulses. Heart sounds: Normal heart sounds. Pulmonary:      Effort: Pulmonary effort is normal.      Breath sounds: Normal breath sounds and air entry. Abdominal:      General: Abdomen is flat. Bowel sounds are normal.      Palpations: Abdomen is soft. Tenderness: There is no abdominal tenderness. There is no right CVA tenderness, left CVA tenderness, guarding or rebound.  Negative signs include Casarez's sign, Rovsing's sign, McBurney's sign, psoas sign and obturator sign. Musculoskeletal:         General: Normal range of motion. Cervical back: Normal range of motion and neck supple. Skin:     General: Skin is warm and dry. Capillary Refill: Capillary refill takes less than 2 seconds. Neurological:      General: No focal deficit present. Mental Status: She is alert and oriented to person, place, and time. Mental status is at baseline. Psychiatric:         Mood and Affect: Mood normal.         Behavior: Behavior normal.       Plan:          1. Acute cystitis without hematuria  -     sulfamethoxazole-trimethoprim (BACTRIM DS;SEPTRA DS) 800-160 MG per tablet; Take 1 tablet by mouth 2 times daily for 7 days, Disp-14 tablet, R-0Normal  -     phenazopyridine (PYRIDIUM) 100 MG tablet; Take 1 tablet by mouth 3 times daily as needed for Pain, Disp-21 tablet, R-0Normal  -     Culture, Urine; Future  2.  Pain with urination  -     POCT Urinalysis no Micro     Results for POC orders placed in visit on 09/26/22   POCT Urinalysis no Micro   Result Value Ref Range    Color, UA yellow     Clarity, UA cloudy     Glucose, UA POC negative     Bilirubin, UA negative     Ketones, UA negative     Spec Grav, UA 1.025     Blood, UA POC negative     pH, UA 6.5     Protein, UA POC negative     Urobilinogen, UA 0.2     Leukocytes, UA small     Nitrite, UA negative     Appearance, Fluid Cloudy (A) Clear, Slightly Cloudy       Results for POC orders placed in visit on 09/26/22   POCT Urinalysis no Micro   Result Value Ref Range    Color, UA yellow     Clarity, UA cloudy     Glucose, UA POC negative     Bilirubin, UA negative     Ketones, UA negative     Spec Grav, UA 1.025     Blood, UA POC negative     pH, UA 6.5     Protein, UA POC negative     Urobilinogen, UA 0.2     Leukocytes, UA small     Nitrite, UA negative     Appearance, Fluid Cloudy (A) Clear, Slightly Cloudy     Urine culture pending   Continue over-the-counter medications as needed for symptoms. Go to the ER for shortness of breath, chest pain. Patient verbalized understanding. Follow Up Instructions:      Return if symptoms worsen or fail to improve, for SOB, chest pain go to ER. Orders Placed This Encounter   Medications    sulfamethoxazole-trimethoprim (BACTRIM DS;SEPTRA DS) 800-160 MG per tablet     Sig: Take 1 tablet by mouth 2 times daily for 7 days     Dispense:  14 tablet     Refill:  0    phenazopyridine (PYRIDIUM) 100 MG tablet     Sig: Take 1 tablet by mouth 3 times daily as needed for Pain     Dispense:  21 tablet     Refill:  0           Patient instructed to complete entire antibiotic course. Increase fluid intake. Educational materials regarding UTI given on AVS.  Patient agreeable to treatment plan. Follow up if symptoms do not improve/worsen. Patient and/or parent given educational materials - see patient instructions. Discussed use, benefit, and side effects of prescribed medications. All patient questions answered. Patient and/or parent voiced understanding.       Electronically signed by JOSE [place] 9/26/2022 at 6:11 PM

## 2022-09-26 NOTE — PATIENT INSTRUCTIONS
Patient Education        Urinary Tract Infection (UTI) in Women: Care Instructions  Overview     A urinary tract infection, or UTI, is a general term for an infection anywhere between the kidneys and the urethra (where urine comes out). Most UTIs are bladder infections. They often cause pain or burning when you urinate. UTIs are caused by bacteria and can be cured with antibiotics. Be sure to complete your treatment so that the infection does not get worse. Follow-up care is a key part of your treatment and safety. Be sure to make and go to all appointments, and call your doctor if you are having problems. It's also a good idea to know your test results and keep a list of the medicines you take. How can you care for yourself at home? Take your antibiotics as directed. Do not stop taking them just because you feel better. You need to take the full course of antibiotics. Drink extra water and other fluids for the next day or two. This will help make the urine less concentrated and help wash out the bacteria that are causing the infection. (If you have kidney, heart, or liver disease and have to limit fluids, talk with your doctor before you increase the amount of fluids you drink.)  Avoid drinks that are carbonated or have caffeine. They can irritate the bladder. Urinate often. Try to empty your bladder each time. To relieve pain, take a hot bath or lay a heating pad set on low over your lower belly or genital area. Never go to sleep with a heating pad in place. To prevent UTIs  Drink plenty of water each day. This helps you urinate often, which clears bacteria from your system. (If you have kidney, heart, or liver disease and have to limit fluids, talk with your doctor before you increase the amount of fluids you drink.)  Urinate when you need to. If you are sexually active, urinate right after you have sex. Change sanitary pads often.   Avoid douches, bubble baths, feminine hygiene sprays, and other

## 2022-09-27 LAB
CULTURE: NORMAL
SPECIMEN DESCRIPTION: NORMAL

## 2023-02-19 ENCOUNTER — OFFICE VISIT (OUTPATIENT)
Dept: PRIMARY CARE CLINIC | Age: 28
End: 2023-02-19
Payer: COMMERCIAL

## 2023-02-19 VITALS
DIASTOLIC BLOOD PRESSURE: 76 MMHG | HEIGHT: 64 IN | BODY MASS INDEX: 36.54 KG/M2 | TEMPERATURE: 99.1 F | WEIGHT: 214 LBS | OXYGEN SATURATION: 96 % | SYSTOLIC BLOOD PRESSURE: 114 MMHG | HEART RATE: 100 BPM

## 2023-02-19 DIAGNOSIS — H66.93 BILATERAL ACUTE OTITIS MEDIA: Primary | ICD-10-CM

## 2023-02-19 DIAGNOSIS — J01.90 ACUTE BACTERIAL SINUSITIS: ICD-10-CM

## 2023-02-19 DIAGNOSIS — B96.89 ACUTE BACTERIAL SINUSITIS: ICD-10-CM

## 2023-02-19 PROCEDURE — 99213 OFFICE O/P EST LOW 20 MIN: CPT | Performed by: NURSE PRACTITIONER

## 2023-02-19 RX ORDER — ATORVASTATIN CALCIUM 20 MG/1
TABLET, FILM COATED ORAL
COMMUNITY
Start: 2023-02-01

## 2023-02-19 RX ORDER — NORGESTIMATE AND ETHINYL ESTRADIOL 0.25-0.035
KIT ORAL
COMMUNITY
Start: 2023-01-04

## 2023-02-19 RX ORDER — CEFUROXIME AXETIL 500 MG/1
500 TABLET ORAL 2 TIMES DAILY
Qty: 14 TABLET | Refills: 0 | Status: SHIPPED | OUTPATIENT
Start: 2023-02-19 | End: 2023-02-26

## 2023-02-19 RX ORDER — PREDNISONE 20 MG/1
40 TABLET ORAL DAILY
Qty: 10 TABLET | Refills: 0 | Status: SHIPPED | OUTPATIENT
Start: 2023-02-19 | End: 2023-02-24

## 2023-02-19 ASSESSMENT — ENCOUNTER SYMPTOMS
SORE THROAT: 1
CHEST TIGHTNESS: 0
WHEEZING: 0
EYE DISCHARGE: 0
VOICE CHANGE: 0
EYE REDNESS: 0
SHORTNESS OF BREATH: 0
SINUS PRESSURE: 0
COUGH: 0

## 2023-02-19 NOTE — PROGRESS NOTES
[address] WALK IN CARE  [address] [place] [address]  Dept: [phone]  Dept Fax: [phone]     Charmayne Howell is a 32 y.o. female who presents to the urgent care today for her medicalconditions/complaints as noted below. Charmayne Howell is c/o of Pharyngitis (Thursday ), Sinus Problem (Green mucus this morning), and Otalgia (Ear pain both ears started yesterday)    HPI:      Sinusitis  This is a new problem. The current episode started in the past 7 days. The problem has been gradually worsening since onset. There has been no fever. Associated symptoms include congestion, ear pain (bilateral) and a sore throat. Pertinent negatives include no chills, coughing, headaches, shortness of breath, sinus pressure or sneezing. Treatments tried: otc tx. The treatment provided no relief.      Past Medical History:   Diagnosis Date    Acne vulgaris     ADHD     Congenital benign skin mole     left hand    Depression 10/2020    Suicidal Ideation    Hyperlipidemia     Hypopigmented skin lesion     left hip    Seborrheic dermatitis     Candy-Parkinson-White (WPW) pattern     Had ablation 09/2012      Current Outpatient Medications   Medication Sig Dispense Refill    atorvastatin (LIPITOR) 20 MG tablet 1 tablet      norgestimate-ethinyl estradiol (ORTHO-CYCLEN) 0.25-35 MG-MCG per tablet TAKE 1 TABLET BY MOUTH EVERY DAY      cefUROXime (CEFTIN) 500 MG tablet Take 1 tablet by mouth 2 times daily for 7 days 14 tablet 0    predniSONE (DELTASONE) 20 MG tablet Take 2 tablets by mouth daily for 5 days 10 tablet 0    metFORMIN HCl 500 MG/5ML SOLN 1 tablet with a meal      cyclobenzaprine (FLEXERIL) 10 MG tablet TAKE 1 TABLET BY MOUTH EVERY DAY AT NIGHT AS NEEDED FOR MUSCLE SPASM 30 tablet 0    atomoxetine (STRATTERA) 40 MG capsule TAKE 1 CAPSULE BY MOUTH EVERY DAY 30 capsule 1    drospirenone-ethinyl estradiol 3-0.03 MG TABS TAKE 1 TABLET BY MOUTH ONE TIME A DAY 28 tablet 2    venlafaxine (EFFEXOR XR) 75 MG extended release capsule Take 2 capsules by mouth daily 30 capsule 5    ondansetron (ZOFRAN) 4 MG tablet Take 1 tablet by mouth every 8 hours as needed for Nausea or Vomiting (1- 2TABS EVERY 8 HOURS PRN) (Patient not taking: No sig reported) 30 tablet 0    ondansetron (ZOFRAN ODT) 4 MG disintegrating tablet Take 1 tablet by mouth every 6 hours as needed for Nausea or Vomiting (Patient not taking: No sig reported) 15 tablet 0    fluticasone (FLONASE) 50 MCG/ACT nasal spray 2 sprays by Nasal route daily (Patient not taking: No sig reported) 1 Bottle 0    naproxen (NAPROSYN) 500 MG tablet Take 1 tablet by mouth 2 times daily as needed for Pain (Patient not taking: Reported on 2/19/2023) 60 tablet 5     No current facility-administered medications for this visit. Allergies   Allergen Reactions    Amoxicillin     Strawberry Extract Swelling     Reviewed PMH, SH, and FH with the patient and updated. Subjective:      Review of Systems   Constitutional:  Negative for chills, fatigue and fever. HENT:  Positive for congestion, ear pain (bilateral), hearing loss (left) and sore throat. Negative for ear discharge, postnasal drip, sinus pressure, sneezing and voice change. Eyes:  Negative for discharge and redness. Respiratory:  Negative for cough, chest tightness, shortness of breath and wheezing. Cardiovascular: Negative. Negative for chest pain. Musculoskeletal:  Negative for myalgias. Skin:  Negative for rash. Neurological:  Negative for dizziness, weakness, light-headedness and headaches. Hematological:  Negative for adenopathy. All other systems reviewed and are negative. Objective:      Physical Exam  Vitals and nursing note reviewed. Constitutional:       General: She is not in acute distress. Appearance: Normal appearance. She is well-developed. She is not ill-appearing, toxic-appearing or diaphoretic.    HENT:      Head: Normocephalic. Right Ear: External ear normal. Tympanic membrane is erythematous and bulging. Left Ear: External ear normal. Tympanic membrane is erythematous and bulging. Nose:      Right Sinus: Maxillary sinus tenderness present. No frontal sinus tenderness. Left Sinus: Maxillary sinus tenderness present. No frontal sinus tenderness. Mouth/Throat:      Pharynx: Pharyngeal swelling, oropharyngeal exudate (PND) and posterior oropharyngeal erythema present. Eyes:      General:         Right eye: No discharge. Left eye: No discharge. Cardiovascular:      Rate and Rhythm: Normal rate and regular rhythm. Heart sounds: Normal heart sounds. No murmur heard. Pulmonary:      Effort: Pulmonary effort is normal. No respiratory distress. Breath sounds: Normal breath sounds. No wheezing or rales. Lymphadenopathy:      Cervical: Cervical adenopathy present. Skin:     General: Skin is warm. Findings: No rash. Neurological:      Mental Status: She is alert. /76   Pulse 100   Temp 99.1 °F (37.3 °C)   Ht 5' 3.5\" (1.613 m)   Wt 214 lb (97.1 kg)   SpO2 96%   BMI 37.31 kg/m²     Assessment:       Diagnosis Orders   1. Bilateral acute otitis media  cefUROXime (CEFTIN) 500 MG tablet      2. Acute bacterial sinusitis  cefUROXime (CEFTIN) 500 MG tablet    predniSONE (DELTASONE) 20 MG tablet        Plan:      Patient instructed to complete antibiotic prescription fully. Prednisone sent to the pharmacy to help enable symptom relief. May use Tylenol for fever/pain. Warm compresses as desired for ear pain. Note provided to excuse absence from work due to illness. Patient agreeable to treatment plan. Educational materials provided on AVS.  Follow up if symptoms do not improve.     Orders Placed This Encounter   Medications    cefUROXime (CEFTIN) 500 MG tablet     Sig: Take 1 tablet by mouth 2 times daily for 7 days     Dispense:  14 tablet     Refill:  0 predniSONE (DELTASONE) 20 MG tablet     Sig: Take 2 tablets by mouth daily for 5 days     Dispense:  10 tablet     Refill:  0          Patient given educational materials - see patient instructions. Discussed use, benefit, and side effects of prescribed medications. All patientquestions answered. Pt voiced understanding.     Electronically signed by JOSE Das CNP on 2/19/2023at 3:43 PM

## 2023-02-19 NOTE — LETTER
[address] [place] [address]  Phone: [phone]  Fax: [phone]    JOSE Donahue CNP        February 19, 2023     Patient: Madelin Gonsales   YOB: 1995   Date of Visit: 2/19/2023       To Whom it May Concern:    Lexi Gant was seen in my clinic on 2/19/2023. Please excuse her absence on 2/20/2023. If you have any questions or concerns, please don't hesitate to call.     Sincerely,         JOSE Donahue CNP

## 2023-05-08 ENCOUNTER — OFFICE VISIT (OUTPATIENT)
Dept: NEUROLOGY | Age: 28
End: 2023-05-08
Payer: COMMERCIAL

## 2023-05-08 VITALS
HEART RATE: 96 BPM | DIASTOLIC BLOOD PRESSURE: 80 MMHG | HEIGHT: 63 IN | BODY MASS INDEX: 37.92 KG/M2 | SYSTOLIC BLOOD PRESSURE: 128 MMHG | WEIGHT: 214 LBS

## 2023-05-08 DIAGNOSIS — G43.831 INTRACTABLE MENSTRUAL MIGRAINE WITH STATUS MIGRAINOSUS: Primary | ICD-10-CM

## 2023-05-08 PROCEDURE — 99205 OFFICE O/P NEW HI 60 MIN: CPT | Performed by: PSYCHIATRY & NEUROLOGY

## 2023-05-08 RX ORDER — RIZATRIPTAN BENZOATE 10 MG/1
10 TABLET ORAL
Qty: 20 TABLET | Refills: 0 | Status: SHIPPED | OUTPATIENT
Start: 2023-05-08 | End: 2023-05-08

## 2023-05-08 RX ORDER — LAMOTRIGINE 200 MG/1
200 TABLET ORAL DAILY
COMMUNITY
Start: 2023-03-23

## 2023-05-08 NOTE — PROGRESS NOTES
[facility], [address], [address] [facility]  Ph: [phone] or [phone]  FAX: [phone]    Chief Complaint: headaches      Dear Caitlin Goldstein PA-C     I had the pleasure of seeing your patient today in neurology consultation for her symptoms. As you would recall Arcelia Haley is a 32 y.o. female. R handed woman with history of HLD, intellectual disability,  major depressive episode, borderline intellectual functioning, ADHD is here for evaluation of headaches. Her headaches first started in the middle of high school. ( 16 yrs ) . They usually start around her period , usually at night and she would be unable to sleep. Lasts for  1 hour to a maximum of 3 days to atleast 2ce  a month. It was bifrontal 10/10 in intensity, pulsating , with nausea , vomiting, photophobia, phonophobia. Triggers:- Hot weather , loud sound. No aura . Medications: tylenol , ibuprofen, naproxen ( did not help )   Time : 2 days prior to her periods and last for 3 days during menstruation. She drinks coffee ( 1 cup in the morning ) and then she switches to mountain dew or gaitoraide . Usually she sleeps around midnight and wakes up at 5:45 am ( she has a daughter to help her 9 yr old daughter )     There has been no change in her migraine headaches with the OCPs    Sometimes the headaches wakes her up from sleep. Everyday she has headaches and saw a neurologist in 2016. She doesn't drink water, but drinks a lot of soda ( monster )     She works at [email or web address] as a . Her medications are managed by her psychologist Stephen Macias)  she takes a lot of medications. She `takes Lamictal for her anxiety, flexeril for her anxiety, atomoxetine for ADHD, Effexor is for depression, Venlafaxine 150 mg daily is for depression. She has tried OCPs and is now on Ortho-Cyclen ( has estrogen compound ) .      As per mother she has a intellectual ability of a 4th grade

## 2023-05-10 ENCOUNTER — TELEPHONE (OUTPATIENT)
Dept: NEUROLOGY | Age: 28
End: 2023-05-10

## 2023-05-10 NOTE — TELEPHONE ENCOUNTER
Dr. Ja Freedman saw Rene Mclean in the office and would like to administer Botox injections for her migraines if her insurance will approve it.

## 2023-05-30 ENCOUNTER — OFFICE VISIT (OUTPATIENT)
Dept: PRIMARY CARE CLINIC | Age: 28
End: 2023-05-30
Payer: COMMERCIAL

## 2023-05-30 VITALS
BODY MASS INDEX: 37.91 KG/M2 | DIASTOLIC BLOOD PRESSURE: 72 MMHG | TEMPERATURE: 98.4 F | SYSTOLIC BLOOD PRESSURE: 114 MMHG | OXYGEN SATURATION: 99 % | HEART RATE: 80 BPM | WEIGHT: 214 LBS

## 2023-05-30 DIAGNOSIS — M79.645 FINGER PAIN, LEFT: ICD-10-CM

## 2023-05-30 DIAGNOSIS — S62.657A CLOSED NONDISPLACED FRACTURE OF MIDDLE PHALANX OF LEFT LITTLE FINGER, INITIAL ENCOUNTER: Primary | ICD-10-CM

## 2023-05-30 PROCEDURE — 99213 OFFICE O/P EST LOW 20 MIN: CPT | Performed by: FAMILY MEDICINE

## 2023-05-30 NOTE — PATIENT INSTRUCTIONS
Fracture of the middle phalanx of the left pinky finger. Will call with final xray read when available. Please follow up with hand surgeon as soon as possible (within the next week). Call insurance tomorrow and have them send you a list of covered hand surgeons. Take referral to whichever surgeon you choose. Continue to rest, ice and take tylenol/motrin as needed for pain. Keep finger in alluminum splint in slightly flexed position.  If symptoms worsen or do not improve please follow-up with PCP or return to clinic

## 2023-05-30 NOTE — PROGRESS NOTES
[address] WALK IN CARE  [address] [place] [address]  Dept: [phone]  Dept Fax: [phone]    Emy Ryan is a 32 y.o. female who presents today for her medical conditions/complaintsas noted below. Emy Ryan is c/o of Hand Injury (Jammed the pinky finger on left hand yesterday )        HPI:     Hand Pain   The incident occurred 12 to 24 hours ago. The incident occurred at home. The injury mechanism was a direct blow (jammed pinky into extension with basketball). The pain is present in the left fingers (pinky). The quality of the pain is described as aching. The pain radiates to the left hand. The pain is severe. The pain has been Constant since the incident. Associated symptoms include muscle weakness. Pertinent negatives include no numbness or tingling. The symptoms are aggravated by palpation and movement. She has tried ice and NSAIDs for the symptoms. The treatment provided mild relief.      Past Medical History:   Diagnosis Date    Acne vulgaris     ADHD     Congenital benign skin mole     left hand    Depression 10/2020    Suicidal Ideation    Hyperlipidemia     Hypopigmented skin lesion     left hip    Seborrheic dermatitis     Candy-Parkinson-White (WPW) pattern     Had ablation 09/2012    Past medical history reviewed and pertinent positives/negatives in the HPI    Past Surgical History:   Procedure Laterality Date    ATRIAL ABLATION SURGERY  09/2012    due to WPW    EXCISION/BIOPSY Left 11/09/2020    HAND LESION BIOPSY EXCISION - Left    HAND SURGERY Left 11/9/2020    HAND LESION BIOPSY EXCISION performed by Lynnette Tom MD at [facility] History   Problem Relation Age of Onset    High Blood Pressure Mother     Seizures Father     Lung Cancer Paternal Grandmother     Lung Cancer Paternal Grandfather        Social History     Tobacco Use    Smoking status: Never    Smokeless tobacco:

## 2023-06-05 ENCOUNTER — TELEPHONE (OUTPATIENT)
Dept: PRIMARY CARE CLINIC | Age: 28
End: 2023-06-05

## 2023-06-05 DIAGNOSIS — S62.657A CLOSED NONDISPLACED FRACTURE OF MIDDLE PHALANX OF LEFT LITTLE FINGER, INITIAL ENCOUNTER: Primary | ICD-10-CM

## 2023-06-05 NOTE — TELEPHONE ENCOUNTER
Pt stated the referral given at appointment on 05/30/2022 has a very long waiting list pt is requesting a referral to someone that can see her soon as possible

## 2023-06-26 ENCOUNTER — OFFICE VISIT (OUTPATIENT)
Dept: NEUROLOGY | Age: 28
End: 2023-06-26
Payer: COMMERCIAL

## 2023-06-26 VITALS
SYSTOLIC BLOOD PRESSURE: 120 MMHG | DIASTOLIC BLOOD PRESSURE: 83 MMHG | WEIGHT: 214.6 LBS | OXYGEN SATURATION: 95 % | HEART RATE: 98 BPM | BODY MASS INDEX: 38.02 KG/M2 | HEIGHT: 63 IN

## 2023-06-26 DIAGNOSIS — G43.831 INTRACTABLE MENSTRUAL MIGRAINE WITH STATUS MIGRAINOSUS: Primary | ICD-10-CM

## 2023-06-26 PROCEDURE — 99214 OFFICE O/P EST MOD 30 MIN: CPT | Performed by: PSYCHIATRY & NEUROLOGY

## 2023-06-26 RX ORDER — SUMATRIPTAN 50 MG/1
50 TABLET, FILM COATED ORAL
Qty: 9 TABLET | Refills: 2 | Status: SHIPPED | OUTPATIENT
Start: 2023-06-26 | End: 2023-06-28 | Stop reason: SDUPTHER

## 2023-06-26 RX ORDER — VENLAFAXINE HYDROCHLORIDE 150 MG/1
150 CAPSULE, EXTENDED RELEASE ORAL DAILY
COMMUNITY
Start: 2023-06-05

## 2023-06-26 RX ORDER — ERENUMAB-AOOE 70 MG/ML
70 INJECTION SUBCUTANEOUS
Qty: 2 ADJUSTABLE DOSE PRE-FILLED PEN SYRINGE | Refills: 3 | Status: SHIPPED | OUTPATIENT
Start: 2023-06-26 | End: 2023-08-25

## 2023-06-28 DIAGNOSIS — G43.831 INTRACTABLE MENSTRUAL MIGRAINE WITH STATUS MIGRAINOSUS: ICD-10-CM

## 2023-06-28 NOTE — TELEPHONE ENCOUNTER
Received a fax from [address] [place] [address] regarding 6/26/23 Sumatriptan Rx. Fax states they need frequency of how patient will be using the medication as well as days supply limitations. This was discussed with Dr. Madalyn Fuentes.   Previous Rx will be cancelled and a new Rx will generated and sent to Dr. Madalyn Fuentes for his approval.

## 2023-06-29 RX ORDER — SUMATRIPTAN 50 MG/1
TABLET, FILM COATED ORAL
Qty: 9 TABLET | Refills: 2 | Status: SHIPPED | OUTPATIENT
Start: 2023-06-29

## 2023-07-07 ENCOUNTER — TELEPHONE (OUTPATIENT)
Dept: NEUROLOGY | Age: 28
End: 2023-07-07

## 2023-07-17 NOTE — TELEPHONE ENCOUNTER
The patient's prior authorization for the Lorence Ou has been denied because the patient has not had a one month trial of two of the following oral medications:    Anti-Depressants such as amitriptyline, venlafaxine    Beta-Blockers such as propranolol, metoprolol, timolol, atenolol    Anti-Epileptics such as valproate, topiramate    Angiotensin converting enzyme inhibitors/Angiotensin II Receptor Blockers such as lisinopril, candesartan    Please advise, thank you.

## 2023-08-01 ENCOUNTER — TELEPHONE (OUTPATIENT)
Dept: NEUROLOGY | Age: 28
End: 2023-08-01

## 2023-08-01 NOTE — TELEPHONE ENCOUNTER
Received a fax back from [place] pt. insurance that they approved Botox. They had denied the Aimovig injections that you prescribed last visit. Can we schedule her for Botox injections if she is agreeable?

## 2023-08-07 NOTE — TELEPHONE ENCOUNTER
I called Dyan back and lm that Dr. Jeanette Barroso did offer to do a steroid taper if she was still having acute headache. If she is  interested in that to please call the office back.

## 2023-08-07 NOTE — TELEPHONE ENCOUNTER
I called Dyan to schedule her. She states she works everyday and can't come in before work and her mother works until [employer]. She does not want to try botox at this time.

## 2023-10-26 ENCOUNTER — NURSE ONLY (OUTPATIENT)
Dept: PRIMARY CARE CLINIC | Age: 28
End: 2023-10-26

## 2023-10-26 DIAGNOSIS — R05.3 CHRONIC COUGH: Primary | ICD-10-CM

## 2023-11-13 ENCOUNTER — OFFICE VISIT (OUTPATIENT)
Dept: PRIMARY CARE CLINIC | Age: 28
End: 2023-11-13
Payer: COMMERCIAL

## 2023-11-13 ENCOUNTER — HOSPITAL ENCOUNTER (OUTPATIENT)
Age: 28
Setting detail: SPECIMEN
Discharge: HOME | End: 2023-11-13

## 2023-11-13 VITALS
DIASTOLIC BLOOD PRESSURE: 72 MMHG | SYSTOLIC BLOOD PRESSURE: 112 MMHG | TEMPERATURE: 97.9 F | HEART RATE: 107 BPM | OXYGEN SATURATION: 95 %

## 2023-11-13 DIAGNOSIS — U07.1 POSITIVE SELF-ADMINISTERED ANTIGEN TEST FOR COVID-19: Primary | ICD-10-CM

## 2023-11-13 PROCEDURE — 99213 OFFICE O/P EST LOW 20 MIN: CPT

## 2023-11-13 RX ORDER — NORETHINDRONE ACETATE AND ETHINYL ESTRADIOL 1.5; .03 MG/1; MG/1
TABLET ORAL
COMMUNITY
Start: 2023-10-20

## 2023-11-13 RX ORDER — ALBUTEROL SULFATE 90 UG/1
AEROSOL, METERED RESPIRATORY (INHALATION)
COMMUNITY
Start: 2023-10-25

## 2023-11-13 RX ORDER — ATOMOXETINE 80 MG/1
80 CAPSULE ORAL EVERY MORNING
COMMUNITY
Start: 2023-10-16

## 2023-11-13 ASSESSMENT — ENCOUNTER SYMPTOMS
EYE DISCHARGE: 0
ANAL BLEEDING: 0
WHEEZING: 0
COLOR CHANGE: 0
HEMOPTYSIS: 0
APNEA: 0
RHINORRHEA: 0
TROUBLE SWALLOWING: 0
CHEST TIGHTNESS: 0
EYES NEGATIVE: 1
GASTROINTESTINAL NEGATIVE: 1
SINUS PAIN: 0
EYE REDNESS: 0
CHOKING: 0
STRIDOR: 0
ABDOMINAL DISTENTION: 0
VOMITING: 0
EYE PAIN: 0
EYE ITCHING: 0
BACK PAIN: 0
FACIAL SWELLING: 0
SORE THROAT: 0
BLOOD IN STOOL: 0
DIARRHEA: 0
HEARTBURN: 0
SINUS PRESSURE: 0
RECTAL PAIN: 0
CONSTIPATION: 0
NAUSEA: 0
VOICE CHANGE: 0
COUGH: 1
ABDOMINAL PAIN: 0
SHORTNESS OF BREATH: 0
PHOTOPHOBIA: 0

## 2023-11-13 NOTE — PROGRESS NOTES
[address] [facility] WALK IN CARE  [address] [facility] [address] [facility] WALK IN CARE  [address] [facility]  [address] [place] [address]  Dept: [phone] Wencesla[address] Snyder is a 25 y.o. female Established patient, who presents to the walk-in clinic today with conditions/complaints as noted below:    Chief Complaint   Patient presents with    OTHER     Needs note work stating ok to return - had a positive home test on 11/8         HPI:     Cough  This is a new problem. Episode onset: 4 weeks ago. The problem has been unchanged. The problem occurs constantly. The cough is Non-productive. Associated symptoms include chills (resolved) and headaches (resolved). Pertinent negatives include no chest pain, ear congestion, ear pain, eye redness, fever, heartburn, hemoptysis, myalgias, nasal congestion, postnasal drip, rash, rhinorrhea, sore throat, shortness of breath, sweats, weight loss or wheezing. She has tried rest (Paxlovid) for the symptoms. The treatment provided mild relief. Pt reports she began to have symptoms 11/7 and tested + at home for [facility] on 11/8. She had chills, headaches and cough. Chills and headaches are resolved, she has a slight cough. She was sent Paxlovid by her PCP. Today, she is present as employer is requesting COVID testing to return to work and work note request. I did inform pt that we are unable to back date her note to 11/8.     Past Medical History:   Diagnosis Date    Acne vulgaris     ADHD     Congenital benign skin mole     left hand    Depression 10/2020    Suicidal Ideation    Hyperlipidemia     Hypopigmented skin lesion     left hip    Seborrheic dermatitis     Candy-Parkinson-White (WPW) pattern     Had ablation 09/2012       Current Outpatient Medications   Medication Sig Dispense Refill    VENTOLIN  (90 Base) MCG/ACT inhaler       TRUMAN

## 2023-11-14 LAB
SARS-COV-2 RNA RESP QL NAA+PROBE: ABNORMAL
SARS-COV-2 RNA RESP QL NAA+PROBE: DETECTED
SOURCE: ABNORMAL

## 2024-03-01 ENCOUNTER — TELEPHONE (OUTPATIENT)
Dept: NEUROLOGY | Age: 29
End: 2024-03-01

## 2024-03-01 NOTE — TELEPHONE ENCOUNTER
03 01 2024 I called the patient times 2 (02 19 2024 and 03 01 2024 at  [phone]) to schedule an appointment with RANDEE Morataya both times, no response.  I mailed the patient a letter asking them to call the office back to schedule this appointment.  KS

## 2024-03-01 NOTE — TELEPHONE ENCOUNTER
03 01 2024 I called the patient times 2 (02 19 2024 and 03 01 2024 at  [phone]) to schedule new patient appointment with one of our providers, RANDEE both times, no response.  I mailed the patient a letter asking them to call the office back to schedule this appointment.  KS

## 2024-03-27 ENCOUNTER — OFFICE VISIT (OUTPATIENT)
Dept: PRIMARY CARE CLINIC | Age: 29
End: 2024-03-27
Payer: COMMERCIAL

## 2024-03-27 VITALS
BODY MASS INDEX: 39.51 KG/M2 | WEIGHT: 223 LBS | HEART RATE: 101 BPM | SYSTOLIC BLOOD PRESSURE: 118 MMHG | DIASTOLIC BLOOD PRESSURE: 69 MMHG | HEIGHT: 63 IN | OXYGEN SATURATION: 96 %

## 2024-03-27 DIAGNOSIS — H66.91 RIGHT ACUTE OTITIS MEDIA: Primary | ICD-10-CM

## 2024-03-27 PROCEDURE — 99213 OFFICE O/P EST LOW 20 MIN: CPT | Performed by: NURSE PRACTITIONER

## 2024-03-27 RX ORDER — PREDNISONE 20 MG/1
40 TABLET ORAL DAILY
Qty: 10 TABLET | Refills: 0 | Status: SHIPPED | OUTPATIENT
Start: 2024-03-27 | End: 2024-03-27

## 2024-03-27 RX ORDER — PREDNISONE 20 MG/1
40 TABLET ORAL DAILY
Qty: 10 TABLET | Refills: 0 | Status: SHIPPED | OUTPATIENT
Start: 2024-03-27 | End: 2024-04-01

## 2024-03-27 RX ORDER — AZITHROMYCIN 250 MG/1
TABLET, FILM COATED ORAL
Qty: 1 PACKET | Refills: 0 | Status: SHIPPED | OUTPATIENT
Start: 2024-03-27

## 2024-03-27 RX ORDER — AZITHROMYCIN 250 MG/1
TABLET, FILM COATED ORAL
Qty: 1 PACKET | Refills: 0 | Status: SHIPPED | OUTPATIENT
Start: 2024-03-27 | End: 2024-03-27

## 2024-03-27 ASSESSMENT — ENCOUNTER SYMPTOMS
COUGH: 1
EYE REDNESS: 0
SORE THROAT: 1
VOICE CHANGE: 0
CHEST TIGHTNESS: 0
EYE DISCHARGE: 0
SHORTNESS OF BREATH: 0
WHEEZING: 0
SINUS PRESSURE: 0

## 2024-03-27 NOTE — PROGRESS NOTES
[facility] PHYSICIANS [facility], [facility] [place] [facility]  [address] [facility] [address]  Dept: [phone]  Dept Fax: [phone]     Dyan Rivers is a 28 y.o. female who presents to the urgent care today for her medicalconditions/complaints as noted below.  Dyan Rivers is c/o of Otalgia (Rt ear pain)    HPI:      Otalgia   There is pain in the right ear. This is a new problem. The current episode started in the past 7 days. The problem has been gradually worsening. There has been no fever. Associated symptoms include coughing, hearing loss (right) and a sore throat. Pertinent negatives include no ear discharge, headaches or rash. Treatments tried: otc tx. The treatment provided no relief.       Past Medical History:   Diagnosis Date    Acne vulgaris     ADHD     Congenital benign skin mole     left hand    Depression 10/2020    Suicidal Ideation    Hyperlipidemia     Hypopigmented skin lesion     left hip    Seborrheic dermatitis     Candy-Parkinson-White (WPW) pattern     Had ablation 09/2012           Current Outpatient Medications   Medication Sig Dispense Refill    azithromycin (ZITHROMAX Z-KATEY) 250 MG tablet Take 2 tabs on day 1 followed by 1 tab on days 2-5. 1 packet 0    VENTOLIN  (90 Base) MCG/ACT inhaler       TRUMAN 1.5/30 1.5-30 MG-MCG TABS TAKE 1 TABLET BY MOUTH EVERY DAY FOR 21 DAYS      atomoxetine (STRATTERA) 80 MG capsule Take 1 capsule by mouth every morning      SUMAtriptan (IMITREX) 50 MG tablet Take 1 tab at headache onset, may repeat 2 hours later.  Maximum 2 QD/4 days of the week. 9 tablet 2    venlafaxine (EFFEXOR XR) 150 MG extended release capsule Take 1 capsule by mouth daily      lamoTRIgine (LAMICTAL) 200 MG tablet Take 1 tablet by mouth daily      atorvastatin (LIPITOR) 20 MG tablet 1 tablet      metFORMIN (GLUCOPHAGE) 500 MG tablet Take 1 tablet by mouth Daily      cyclobenzaprine (FLEXERIL) 10 MG tablet TAKE 1 TABLET BY MOUTH

## 2024-05-04 ENCOUNTER — OFFICE VISIT (OUTPATIENT)
Dept: PRIMARY CARE CLINIC | Age: 29
End: 2024-05-04
Payer: COMMERCIAL

## 2024-05-04 VITALS
TEMPERATURE: 98.3 F | HEART RATE: 100 BPM | DIASTOLIC BLOOD PRESSURE: 82 MMHG | SYSTOLIC BLOOD PRESSURE: 123 MMHG | OXYGEN SATURATION: 96 %

## 2024-05-04 DIAGNOSIS — M70.71 BURSITIS OF OTHER BURSA OF RIGHT HIP: Primary | ICD-10-CM

## 2024-05-04 PROCEDURE — 99213 OFFICE O/P EST LOW 20 MIN: CPT

## 2024-05-04 RX ORDER — MEDROXYPROGESTERONE ACETATE 10 MG/1
10 TABLET ORAL DAILY
COMMUNITY
Start: 2024-03-28

## 2024-05-04 RX ORDER — CYCLOBENZAPRINE HCL 10 MG
10 TABLET ORAL 3 TIMES DAILY PRN
Qty: 21 TABLET | Refills: 0 | Status: SHIPPED | OUTPATIENT
Start: 2024-05-04 | End: 2024-05-14

## 2024-05-04 RX ORDER — PRAZOSIN HYDROCHLORIDE 1 MG/1
1 CAPSULE ORAL NIGHTLY
COMMUNITY
Start: 2024-03-11

## 2024-05-04 RX ORDER — LAMOTRIGINE 100 MG/1
100 TABLET ORAL DAILY
COMMUNITY
Start: 2024-04-13

## 2024-05-04 RX ORDER — ATORVASTATIN CALCIUM 40 MG/1
40 TABLET, FILM COATED ORAL DAILY
COMMUNITY
Start: 2024-04-08

## 2024-05-04 RX ORDER — PREDNISONE 20 MG/1
40 TABLET ORAL DAILY
Qty: 10 TABLET | Refills: 0 | Status: SHIPPED | OUTPATIENT
Start: 2024-05-04 | End: 2024-05-09

## 2024-05-04 ASSESSMENT — ENCOUNTER SYMPTOMS
BACK PAIN: 1
NAUSEA: 0
COLOR CHANGE: 0

## 2024-05-04 NOTE — PROGRESS NOTES
[facility] PHYSICIANS [facility], [facility] WALK IN CARE  [address] [facility] [address]  Dept: [phone]  Dept Fax: [phone]    Dyan Rivers is a 28 y.o. female who presents to the urgent care today for her medical conditions/complaints as notedbelow.  Dyan Rivers is c/o of Hip Pain (Rt x last week - NKI)      HPI:     Patient presents to the Walk In Clinic for evaluation of right hip pain, onset 7 days, NKI. Patient reports that it just started hurting out of nowhere. Patient works in service department and notes prolonged standing as well as lots of bending over with heavy lifting.       Hip Pain   The incident occurred more than 1 week ago. There was no injury mechanism. The pain is present in the right hip. The quality of the pain is described as aching. The pain is severe. The pain has been Constant since onset. Pertinent negatives include no inability to bear weight, loss of motion, loss of sensation, muscle weakness, numbness or tingling. She reports no foreign bodies present. The symptoms are aggravated by weight bearing and movement. She has tried NSAIDs and heat for the symptoms. The treatment provided no relief.       Past Medical History:   Diagnosis Date    Acne vulgaris     ADHD     Congenital benign skin mole     left hand    Depression 10/2020    Suicidal Ideation    Hyperlipidemia     Hypopigmented skin lesion     left hip    Seborrheic dermatitis     Candy-Parkinson-White (WPW) pattern     Had ablation 09/2012        Current Outpatient Medications   Medication Sig Dispense Refill    medroxyPROGESTERone (PROVERA) 10 MG tablet Take 1 tablet by mouth daily with food      prazosin (MINIPRESS) 1 MG capsule Take 1 capsule by mouth nightly      atorvastatin (LIPITOR) 40 MG tablet Take 1 tablet by mouth daily      lamoTRIgine (LAMICTAL) 100 MG tablet Take 1 tablet by mouth daily      predniSONE (DELTASONE) 20 MG tablet Take 2 tablets by mouth daily for

## 2024-05-13 ENCOUNTER — NURSE ONLY (OUTPATIENT)
Dept: PRIMARY CARE CLINIC | Age: 29
End: 2024-05-13

## 2024-05-13 DIAGNOSIS — M25.551 PAIN IN RIGHT HIP: Primary | ICD-10-CM

## 2024-07-02 ENCOUNTER — OFFICE VISIT (OUTPATIENT)
Dept: PRIMARY CARE CLINIC | Age: 29
End: 2024-07-02
Payer: COMMERCIAL

## 2024-07-02 VITALS
BODY MASS INDEX: 39.5 KG/M2 | TEMPERATURE: 99.3 F | HEART RATE: 100 BPM | DIASTOLIC BLOOD PRESSURE: 98 MMHG | WEIGHT: 223 LBS | SYSTOLIC BLOOD PRESSURE: 137 MMHG | OXYGEN SATURATION: 100 %

## 2024-07-02 DIAGNOSIS — J02.9 SORE THROAT: ICD-10-CM

## 2024-07-02 DIAGNOSIS — R11.2 NAUSEA AND VOMITING, UNSPECIFIED VOMITING TYPE: ICD-10-CM

## 2024-07-02 DIAGNOSIS — L25.9 ACUTE CONTACT DERMATITIS: ICD-10-CM

## 2024-07-02 DIAGNOSIS — B34.9 VIRAL ILLNESS: Primary | ICD-10-CM

## 2024-07-02 LAB — S PYO AG THROAT QL: NORMAL

## 2024-07-02 PROCEDURE — 99213 OFFICE O/P EST LOW 20 MIN: CPT

## 2024-07-02 PROCEDURE — 87880 STREP A ASSAY W/OPTIC: CPT

## 2024-07-02 RX ORDER — PREDNISONE 20 MG/1
40 TABLET ORAL DAILY
Qty: 10 TABLET | Refills: 0 | Status: SHIPPED | OUTPATIENT
Start: 2024-07-02 | End: 2024-07-07

## 2024-07-02 RX ORDER — TRIAMCINOLONE ACETONIDE 1 MG/G
CREAM TOPICAL
Qty: 160 G | Refills: 0 | OUTPATIENT
Start: 2024-07-02

## 2024-07-02 RX ORDER — ONDANSETRON 4 MG/1
4 TABLET, ORALLY DISINTEGRATING ORAL EVERY 8 HOURS PRN
Qty: 21 TABLET | Refills: 0 | Status: SHIPPED | OUTPATIENT
Start: 2024-07-02 | End: 2024-07-09

## 2024-07-02 SDOH — ECONOMIC STABILITY: HOUSING INSECURITY
IN THE LAST 12 MONTHS, WAS THERE A TIME WHEN YOU DID NOT HAVE A STEADY PLACE TO SLEEP OR SLEPT IN A SHELTER (INCLUDING NOW)?: NO

## 2024-07-02 SDOH — ECONOMIC STABILITY: FOOD INSECURITY: WITHIN THE PAST 12 MONTHS, YOU WORRIED THAT YOUR FOOD WOULD RUN OUT BEFORE YOU GOT MONEY TO BUY MORE.: NEVER TRUE

## 2024-07-02 SDOH — ECONOMIC STABILITY: FOOD INSECURITY: WITHIN THE PAST 12 MONTHS, THE FOOD YOU BOUGHT JUST DIDN'T LAST AND YOU DIDN'T HAVE MONEY TO GET MORE.: NEVER TRUE

## 2024-07-02 SDOH — ECONOMIC STABILITY: INCOME INSECURITY: HOW HARD IS IT FOR YOU TO PAY FOR THE VERY BASICS LIKE FOOD, HOUSING, MEDICAL CARE, AND HEATING?: NOT HARD AT ALL

## 2024-07-02 ASSESSMENT — PATIENT HEALTH QUESTIONNAIRE - PHQ9
1. LITTLE INTEREST OR PLEASURE IN DOING THINGS: NOT AT ALL
SUM OF ALL RESPONSES TO PHQ QUESTIONS 1-9: 0
SUM OF ALL RESPONSES TO PHQ9 QUESTIONS 1 & 2: 0
2. FEELING DOWN, DEPRESSED OR HOPELESS: NOT AT ALL

## 2024-07-02 ASSESSMENT — ENCOUNTER SYMPTOMS
SORE THROAT: 1
VOMITING: 1

## 2024-07-02 NOTE — PROGRESS NOTES
light-headedness, numbness and headaches.       Physical Exam:      BP (!) 137/98   Pulse 100   Temp 99.3 °F (37.4 °C)   Wt 101.2 kg (223 lb)   SpO2 100%   BMI 39.50 kg/m²     Physical Exam  Vitals reviewed.   Constitutional:       Appearance: Normal appearance. She is obese.   HENT:      Head: Normocephalic and atraumatic.      Right Ear: Tympanic membrane, ear canal and external ear normal.      Left Ear: Tympanic membrane, ear canal and external ear normal.      Nose: Nose normal.      Mouth/Throat:      Lips: Pink.      Mouth: Mucous membranes are moist.      Pharynx: Oropharynx is clear. Uvula midline. Posterior oropharyngeal erythema present. No pharyngeal swelling, oropharyngeal exudate or uvula swelling.      Tonsils: No tonsillar exudate or tonsillar abscesses.   Eyes:      Conjunctiva/sclera: Conjunctivae normal.      Pupils: Pupils are equal, round, and reactive to light.   Cardiovascular:      Rate and Rhythm: Normal rate and regular rhythm.      Pulses: Normal pulses.      Heart sounds: Normal heart sounds.   Pulmonary:      Effort: Pulmonary effort is normal.      Breath sounds: Normal breath sounds and air entry.   Abdominal:      General: Abdomen is flat. Bowel sounds are normal.      Palpations: Abdomen is soft.   Musculoskeletal:      Cervical back: Normal range of motion and neck supple.   Skin:     General: Skin is warm and dry.      Capillary Refill: Capillary refill takes less than 2 seconds.      Findings: Erythema and rash present. Rash is macular and papular.      Comments: Maculopapular rash noted on leg and back. They are pruritic, nontender.    Neurological:      General: No focal deficit present.      Mental Status: She is alert and oriented to person, place, and time. Mental status is at baseline.      GCS: GCS eye subscore is 4. GCS verbal subscore is 5. GCS motor subscore is 6.   Psychiatric:         Mood and Affect: Mood normal.         Behavior: Behavior normal.         Plan:

## 2024-07-06 ASSESSMENT — ENCOUNTER SYMPTOMS
EYE ITCHING: 0
APNEA: 0
CONSTIPATION: 0
COUGH: 0
NAUSEA: 0
EYES NEGATIVE: 1
EYE DISCHARGE: 0
EYE REDNESS: 0
CHEST TIGHTNESS: 0
COLOR CHANGE: 0
WHEEZING: 0
SINUS PAIN: 0
RECTAL PAIN: 0
TROUBLE SWALLOWING: 0
SINUS PRESSURE: 0
CHANGE IN BOWEL HABIT: 0
BACK PAIN: 0
ABDOMINAL DISTENTION: 0
SHORTNESS OF BREATH: 0
VISUAL CHANGE: 0
SWOLLEN GLANDS: 0
PHOTOPHOBIA: 0
FACIAL SWELLING: 0
CHOKING: 0
STRIDOR: 0
EYE PAIN: 0
VOICE CHANGE: 0
DIARRHEA: 0
ANAL BLEEDING: 0
RESPIRATORY NEGATIVE: 1
BLOOD IN STOOL: 0
NAIL CHANGES: 0
RHINORRHEA: 0
ABDOMINAL PAIN: 0

## 2024-08-31 ENCOUNTER — HOSPITAL ENCOUNTER (EMERGENCY)
Age: 29
Discharge: HOME | End: 2024-08-31
Payer: COMMERCIAL

## 2024-08-31 ENCOUNTER — APPOINTMENT (OUTPATIENT)
Dept: GENERAL RADIOLOGY | Age: 29
End: 2024-08-31
Payer: COMMERCIAL

## 2024-08-31 VITALS
TEMPERATURE: 98.4 F | BODY MASS INDEX: 38.98 KG/M2 | HEIGHT: 63 IN | HEART RATE: 83 BPM | WEIGHT: 220 LBS | RESPIRATION RATE: 12 BRPM | DIASTOLIC BLOOD PRESSURE: 91 MMHG | OXYGEN SATURATION: 99 % | SYSTOLIC BLOOD PRESSURE: 165 MMHG

## 2024-08-31 DIAGNOSIS — R07.89 ATYPICAL CHEST PAIN: ICD-10-CM

## 2024-08-31 DIAGNOSIS — J06.9 VIRAL URI: Primary | ICD-10-CM

## 2024-08-31 LAB
ANION GAP SERPL CALCULATED.3IONS-SCNC: 15 MMOL/L (ref 9–17)
BASOPHILS # BLD: 0.05 K/UL (ref 0–0.2)
BASOPHILS NFR BLD: 1 % (ref 0–2)
BUN SERPL-MCNC: 7 MG/DL (ref 6–20)
BUN/CREAT SERPL: 9 (ref 9–20)
CALCIUM SERPL-MCNC: 9.6 MG/DL (ref 8.6–10.4)
CHLORIDE SERPL-SCNC: 101 MMOL/L (ref 98–107)
CO2 SERPL-SCNC: 20 MMOL/L (ref 20–31)
CREAT SERPL-MCNC: 0.8 MG/DL (ref 0.5–0.9)
EKG ATRIAL RATE: 82 BPM
EKG P AXIS: 44 DEGREES
EKG P-R INTERVAL: 146 MS
EKG Q-T INTERVAL: 382 MS
EKG QRS DURATION: 76 MS
EKG QTC CALCULATION (BAZETT): 446 MS
EKG R AXIS: 31 DEGREES
EKG T AXIS: 14 DEGREES
EKG VENTRICULAR RATE: 82 BPM
EOSINOPHIL # BLD: 0.17 K/UL (ref 0–0.44)
EOSINOPHILS RELATIVE PERCENT: 2 % (ref 1–4)
ERYTHROCYTE [DISTWIDTH] IN BLOOD BY AUTOMATED COUNT: 13 % (ref 11.8–14.4)
GFR, ESTIMATED: >90 ML/MIN/1.73M2
GLUCOSE SERPL-MCNC: 86 MG/DL (ref 70–99)
HCT VFR BLD AUTO: 38.2 % (ref 36.3–47.1)
HGB BLD-MCNC: 13 G/DL (ref 11.9–15.1)
IMM GRANULOCYTES # BLD AUTO: 0.03 K/UL (ref 0–0.3)
IMM GRANULOCYTES NFR BLD: 0 %
LYMPHOCYTES NFR BLD: 2.47 K/UL (ref 1.1–3.7)
LYMPHOCYTES RELATIVE PERCENT: 27 % (ref 24–43)
MCH RBC QN AUTO: 30 PG (ref 25.2–33.5)
MCHC RBC AUTO-ENTMCNC: 34 G/DL (ref 28.4–34.8)
MCV RBC AUTO: 88.2 FL (ref 82.6–102.9)
MONOCYTES NFR BLD: 0.68 K/UL (ref 0.1–1.2)
MONOCYTES NFR BLD: 8 % (ref 3–12)
NEUTROPHILS NFR BLD: 62 % (ref 36–65)
NEUTS SEG NFR BLD: 5.64 K/UL (ref 1.5–8.1)
NRBC BLD-RTO: 0 PER 100 WBC
PLATELET # BLD AUTO: 380 K/UL (ref 138–453)
PMV BLD AUTO: 9.6 FL (ref 8.1–13.5)
POTASSIUM SERPL-SCNC: 4 MMOL/L (ref 3.7–5.3)
RBC # BLD AUTO: 4.33 M/UL (ref 3.95–5.11)
SARS-COV-2 RDRP RESP QL NAA+PROBE: NOT DETECTED
SODIUM SERPL-SCNC: 136 MMOL/L (ref 135–144)
SPECIMEN DESCRIPTION: NORMAL
TROPONIN I SERPL HS-MCNC: <6 NG/L (ref 0–14)
WBC OTHER # BLD: 9 K/UL (ref 3.5–11.3)

## 2024-08-31 PROCEDURE — 84484 ASSAY OF TROPONIN QUANT: CPT

## 2024-08-31 PROCEDURE — 85025 COMPLETE CBC W/AUTO DIFF WBC: CPT

## 2024-08-31 PROCEDURE — 93005 ELECTROCARDIOGRAM TRACING: CPT | Performed by: EMERGENCY MEDICINE

## 2024-08-31 PROCEDURE — 2580000003 HC RX 258: Performed by: NURSE PRACTITIONER

## 2024-08-31 PROCEDURE — 80048 BASIC METABOLIC PNL TOTAL CA: CPT

## 2024-08-31 PROCEDURE — 87635 SARS-COV-2 COVID-19 AMP PRB: CPT

## 2024-08-31 PROCEDURE — 96374 THER/PROPH/DIAG INJ IV PUSH: CPT

## 2024-08-31 PROCEDURE — 99285 EMERGENCY DEPT VISIT HI MDM: CPT

## 2024-08-31 PROCEDURE — 36415 COLL VENOUS BLD VENIPUNCTURE: CPT

## 2024-08-31 PROCEDURE — 6360000002 HC RX W HCPCS: Performed by: NURSE PRACTITIONER

## 2024-08-31 PROCEDURE — 71045 X-RAY EXAM CHEST 1 VIEW: CPT

## 2024-08-31 RX ORDER — 0.9 % SODIUM CHLORIDE 0.9 %
1000 INTRAVENOUS SOLUTION INTRAVENOUS ONCE
Status: COMPLETED | OUTPATIENT
Start: 2024-08-31 | End: 2024-08-31

## 2024-08-31 RX ORDER — KETOROLAC TROMETHAMINE 30 MG/ML
30 INJECTION, SOLUTION INTRAMUSCULAR; INTRAVENOUS ONCE
Status: COMPLETED | OUTPATIENT
Start: 2024-08-31 | End: 2024-08-31

## 2024-08-31 RX ORDER — ONDANSETRON 4 MG/1
4 TABLET, ORALLY DISINTEGRATING ORAL 3 TIMES DAILY PRN
Qty: 12 TABLET | Refills: 0 | Status: SHIPPED | OUTPATIENT
Start: 2024-08-31

## 2024-08-31 RX ADMIN — KETOROLAC TROMETHAMINE 30 MG: 30 INJECTION, SOLUTION INTRAMUSCULAR at 20:11

## 2024-08-31 RX ADMIN — SODIUM CHLORIDE 1000 ML: 9 INJECTION, SOLUTION INTRAVENOUS at 18:02

## 2024-08-31 ASSESSMENT — ENCOUNTER SYMPTOMS
ABDOMINAL PAIN: 0
SHORTNESS OF BREATH: 1
COUGH: 0
NAUSEA: 1
SORE THROAT: 0
VOMITING: 0
WHEEZING: 0

## 2024-08-31 ASSESSMENT — VISUAL ACUITY: OU: 1

## 2024-08-31 ASSESSMENT — PAIN - FUNCTIONAL ASSESSMENT: PAIN_FUNCTIONAL_ASSESSMENT: 0-10

## 2024-08-31 ASSESSMENT — PAIN DESCRIPTION - PAIN TYPE: TYPE: ACUTE PAIN

## 2024-08-31 ASSESSMENT — PAIN DESCRIPTION - FREQUENCY: FREQUENCY: CONTINUOUS

## 2024-08-31 ASSESSMENT — PAIN DESCRIPTION - DESCRIPTORS
DESCRIPTORS_2: PRESSURE
DESCRIPTORS: ACHING

## 2024-08-31 ASSESSMENT — PAIN DESCRIPTION - LOCATION
LOCATION_2: CHEST
LOCATION: BACK

## 2024-08-31 ASSESSMENT — PAIN DESCRIPTION - ORIENTATION
ORIENTATION: MID
ORIENTATION_2: MID

## 2024-08-31 ASSESSMENT — PAIN SCALES - GENERAL: PAINLEVEL_OUTOF10: 10

## 2024-08-31 NOTE — DISCHARGE INSTRUCTIONS
Take medication as prescribed.  Follow-up with your primary care provider this week.  Return to emergency department for worsening or new symptoms.

## 2024-08-31 NOTE — ED PROVIDER NOTES
[facility] ED  eMERGENCY dEPARTMENT eNCOUnter      Pt Name: Dyan Rivers  MRN: [id number]  Birthdate 1995  Date of evaluation: 8/31/2024  Provider: JOSE Chacon CNP    CHIEF COMPLAINT       Chief Complaint   Patient presents with    Back Pain     Mid back, started today, no accident, falls, injury or trauma.     Chills    Chest Pain     Mid sternal, started today at work, feels SOB.          HISTORY OF PRESENT ILLNESS  (Location/Symptom, Timing/Onset, Context/Setting, Quality, Duration, Modifying Factors, Severity.)   Dyan Rivers is a 28 y.o. female who presents to the emergency department for evaluation of bodyaches, chills, back pain, chest pain shortness of breath and nasal congestion that started today.  Denies cough, abdominal pain, nausea or vomiting.  No headache or dizziness.  Denies recent sick contacts.      Nursing Notes were reviewed.    ALLERGIES     Amoxicillin, Fioricet [butalbital-apap-caffeine], Imitrex [sumatriptan], and Strawberry extract    CURRENT MEDICATIONS       Previous Medications    ATOMOXETINE (STRATTERA) 80 MG CAPSULE    Take 1 capsule by mouth every morning    ATORVASTATIN (LIPITOR) 20 MG TABLET    1 tablet    ATORVASTATIN (LIPITOR) 40 MG TABLET    Take 1 tablet by mouth daily    CALCIUM PO    Take by mouth    DROSPIRENONE-ETHINYL ESTRADIOL 3-0.03 MG TABS    TAKE 1 TABLET BY MOUTH ONE TIME A DAY    TRUMAN 1.5/30 1.5-30 MG-MCG TABS        LAMOTRIGINE (LAMICTAL) 100 MG TABLET    Take 1 tablet by mouth daily    LAMOTRIGINE (LAMICTAL) 200 MG TABLET    Take 1 tablet by mouth daily    MEDROXYPROGESTERONE (PROVERA) 10 MG TABLET    Take 1 tablet by mouth daily with food    METFORMIN (GLUCOPHAGE) 500 MG TABLET    Take 1 tablet by mouth Daily    NORGESTIMATE-ETHINYL ESTRADIOL (ORTHO-CYCLEN) 0.25-35 MG-MCG PER TABLET    TAKE 1 TABLET BY MOUTH EVERY DAY    PRAZOSIN (MINIPRESS) 1 MG CAPSULE    Take 1 capsule by mouth nightly    RIZATRIPTAN (MAXALT) 10 MG TABLET    Take  discussed with the patient.       I have reviewed the patient's previous medical records using the electronic health record that we have available.      Labs and imaging were reviewed.  Imaging was reviewed and reported by the radiologist. Results showed chest x-ray no acute pulmonary abnormality.  Troponin negative.  COVID-negative.  Vital signs are stable.  Patient IV fluids and Toradol in the ED.  Likely viral URI which I discussed with the patient.  She has over-the-counter DayQuil and NyQuil at home for symptoms and will take Motrin Tylenol as needed.  Will discharge on Zofran for nausea.  Discussed test results diagnosis treatment follow-up care with the patient.      Evaluation and treatment course in the ED, and plan of care upon discharge was discussed in length with the patient. Patient had no further questions prior to being discharged and was instructed to return to the ED for new or worsening symptoms.      PERC Rule for Pulmonary Embolism from [employer]  on 8/31/2024  ** All calculations should be rechecked by clinician prior to use **    RESULT SUMMARY:  0 criteria  No need for further workup, as <2% chance of PE.    If no criteria are positive and clinician’s pre-test probability is <15%, PERC Rule criteria are satisfied.      INPUTS:  Age ?50 --> 0 = No  HR ?100 --> 0 = No  O? sat on room air <95% --> 0 = No  Unilateral leg swelling --> 0 = No  Hemoptysis --> 0 = No  Recent surgery or trauma --> 0 = No  Prior PE or DVT --> 0 = No  Hormone use --> 0 = No    HEART SCORE:    HEART Score for Major Cardiac Events from [employer]  on 8/31/2024  ** All calculations should be rechecked by clinician prior to use **    RESULT SUMMARY:  1 points  Low Score (0-3 points)    Risk of MACE of 0.9-1.7%.      INPUTS:  History --> 0 = Slightly suspicious  EKG --> 0 = Normal  Age --> 0 = <45  Risk factors --> 1 = 1-2 risk factors  Initial troponin --> 0 = ?normal

## 2024-08-31 NOTE — ED NOTES
Pt presents to ER from home due to Back pain, Chest pain.Pt states Back pain started yesterday, PT denies injury or trauma.PT states HX of chest pain, with SOB. PT states hx of anxiety.Pt denies Nausea or Vomiting. PT is A/O x 4, equal chest expansion with non labored breathing, wheels locked, bed in lowest position, call light in reach.

## 2024-09-02 LAB
EKG ATRIAL RATE: 82 BPM
EKG P AXIS: 44 DEGREES
EKG P-R INTERVAL: 146 MS
EKG Q-T INTERVAL: 382 MS
EKG QRS DURATION: 76 MS
EKG QTC CALCULATION (BAZETT): 446 MS
EKG R AXIS: 31 DEGREES
EKG T AXIS: 14 DEGREES
EKG VENTRICULAR RATE: 82 BPM

## 2024-10-25 ENCOUNTER — OFFICE VISIT (OUTPATIENT)
Dept: PRIMARY CARE CLINIC | Age: 29
End: 2024-10-25
Payer: COMMERCIAL

## 2024-10-25 VITALS
HEART RATE: 103 BPM | HEIGHT: 63 IN | BODY MASS INDEX: 38.98 KG/M2 | OXYGEN SATURATION: 100 % | WEIGHT: 220 LBS | TEMPERATURE: 98.2 F | DIASTOLIC BLOOD PRESSURE: 93 MMHG | SYSTOLIC BLOOD PRESSURE: 127 MMHG

## 2024-10-25 DIAGNOSIS — B96.89 ACUTE BACTERIAL SINUSITIS: Primary | ICD-10-CM

## 2024-10-25 DIAGNOSIS — J01.90 ACUTE BACTERIAL SINUSITIS: Primary | ICD-10-CM

## 2024-10-25 DIAGNOSIS — L08.89 PITTED KERATOLYSIS: ICD-10-CM

## 2024-10-25 PROCEDURE — 99213 OFFICE O/P EST LOW 20 MIN: CPT | Performed by: NURSE PRACTITIONER

## 2024-10-25 RX ORDER — MUPIROCIN 20 MG/G
OINTMENT TOPICAL
Qty: 15 G | Refills: 0 | Status: SHIPPED | OUTPATIENT
Start: 2024-10-25

## 2024-10-25 RX ORDER — PREDNISONE 20 MG/1
40 TABLET ORAL DAILY
Qty: 10 TABLET | Refills: 0 | Status: SHIPPED | OUTPATIENT
Start: 2024-10-25 | End: 2024-10-30

## 2024-10-25 RX ORDER — PROPRANOLOL HCL 20 MG
20 TABLET ORAL DAILY
COMMUNITY
Start: 2024-10-08

## 2024-10-25 RX ORDER — AZITHROMYCIN 250 MG/1
TABLET, FILM COATED ORAL
Qty: 1 PACKET | Refills: 0 | Status: SHIPPED | OUTPATIENT
Start: 2024-10-25

## 2024-10-25 ASSESSMENT — ENCOUNTER SYMPTOMS
WHEEZING: 0
EYE REDNESS: 0
COUGH: 1
SORE THROAT: 1
CHEST TIGHTNESS: 0
VOICE CHANGE: 0
SINUS PRESSURE: 0
EYE DISCHARGE: 0
SHORTNESS OF BREATH: 0

## 2024-10-25 NOTE — PROGRESS NOTES
[facility] PHYSICIANS [facility], [facility] [place] [facility]  [address]  [facility] [address]  Dept: [phone]  Dept Fax: [phone]     Dyan Rivers is a 28 y.o. female who presents to the urgent care today for her medicalconditions/complaints as noted below.  Dyan Rivers is c/o of Congestion (Nasal congestion with PND and ear fullness. X2-3 days. )    HPI:      Sinusitis  This is a new problem. The current episode started in the past 7 days. The problem has been gradually worsening since onset. There has been no fever. Associated symptoms include congestion, coughing (slight), ear pain (left), headaches and a sore throat. Pertinent negatives include no chills, shortness of breath, sinus pressure or sneezing. Treatments tried: otc tx. The treatment provided no relief.     Past Medical History:   Diagnosis Date    Acne vulgaris     ADHD     Congenital benign skin mole     left hand    Depression 10/2020    Suicidal Ideation    Hyperlipidemia     Hypopigmented skin lesion     left hip    Seborrheic dermatitis     Candy-Parkinson-White (WPW) pattern     Had ablation 09/2012           Current Outpatient Medications   Medication Sig Dispense Refill    propranolol (INDERAL) 20 MG tablet Take 1 tablet by mouth daily      mupirocin (BACTROBAN) 2 % ointment Apply topically 3 times daily. 15 g 0    azithromycin (ZITHROMAX Z-KATEY) 250 MG tablet Take 2 tabs on day 1 followed by 1 tab on days 2-5. 1 packet 0    predniSONE (DELTASONE) 20 MG tablet Take 2 tablets by mouth daily for 5 days 10 tablet 0    ondansetron (ZOFRAN-ODT) 4 MG disintegrating tablet Take 1 tablet by mouth 3 times daily as needed for Nausea or Vomiting 12 tablet 0    triamcinolone (KENALOG) 0.1 % cream Apply topically 2 times daily, until rash gone 160 g 0    medroxyPROGESTERone (PROVERA) 10 MG tablet Take 1 tablet by mouth daily with food      prazosin (MINIPRESS) 1 MG capsule Take 1 capsule by mouth nightly

## 2025-06-09 ENCOUNTER — LAB (OUTPATIENT)
Dept: PRIMARY CARE CLINIC | Age: 30
End: 2025-06-09

## 2025-06-09 DIAGNOSIS — M25.531 PAIN IN RIGHT WRIST: ICD-10-CM

## 2025-06-09 DIAGNOSIS — M79.641 PAIN IN RIGHT HAND: Primary | ICD-10-CM

## 2025-06-09 NOTE — PROGRESS NOTES
Right wrist X-rays ordered by PCP, FREDY Mason, for right wrist pain  Right Hand x-ray ordered by PCP for right hand pain

## (undated) DEVICE — STRIP,CLOSURE,WOUND,MEDI-STRIP,1/2X4: Brand: MEDLINE

## (undated) DEVICE — PENCIL ES L3M BTTN SWCH HOLSTER W/ BLDE ELECTRD EDGE

## (undated) DEVICE — SOLUTION IV IRRIG POUR BRL 0.9% SODIUM CHL 2F7124

## (undated) DEVICE — GLOVE ORANGE PI 7 1/2   MSG9075

## (undated) DEVICE — STANDARD HYPODERMIC NEEDLE,POLYPROPYLENE HUB: Brand: MONOJECT

## (undated) DEVICE — SUTURE VCRL SZ 3-0 L27IN ABSRB UD L19MM PS-2 3/8 CIR PRIM J427H

## (undated) DEVICE — SOLUTION SURG PREP POV IOD 7.5% 4 OZ

## (undated) DEVICE — INTENDED FOR TISSUE SEPARATION, AND OTHER PROCEDURES THAT REQUIRE A SHARP SURGICAL BLADE TO PUNCTURE OR CUT.: Brand: BARD-PARKER ® CARBON RIB-BACK BLADES

## (undated) DEVICE — SUTURE VCRL + SZ 4-0 L18IN ABSRB UD P-3 3/8 CIR REV CUT NDL VCP494H

## (undated) DEVICE — MHPB HAND AND FOOT PACK: Brand: MEDLINE INDUSTRIES, INC.

## (undated) DEVICE — SYRINGE MED 10ML LUERLOCK TIP W/O SFTY DISP